# Patient Record
Sex: FEMALE | Race: OTHER | NOT HISPANIC OR LATINO | Employment: UNEMPLOYED | URBAN - METROPOLITAN AREA
[De-identification: names, ages, dates, MRNs, and addresses within clinical notes are randomized per-mention and may not be internally consistent; named-entity substitution may affect disease eponyms.]

---

## 2018-01-12 ENCOUNTER — OFFICE VISIT (OUTPATIENT)
Dept: OBSTETRICS AND GYNECOLOGY | Facility: CLINIC | Age: 33
End: 2018-01-12
Payer: MEDICAID

## 2018-01-12 VITALS
BODY MASS INDEX: 29.55 KG/M2 | WEIGHT: 183.88 LBS | SYSTOLIC BLOOD PRESSURE: 128 MMHG | HEIGHT: 66 IN | DIASTOLIC BLOOD PRESSURE: 80 MMHG

## 2018-01-12 DIAGNOSIS — R10.2 PELVIC PAIN IN FEMALE: Primary | ICD-10-CM

## 2018-01-12 DIAGNOSIS — R87.612 LGSIL ON PAP SMEAR OF CERVIX: ICD-10-CM

## 2018-01-12 DIAGNOSIS — D18.01 HEMANGIOMA OF SKIN: ICD-10-CM

## 2018-01-12 DIAGNOSIS — Z30.015 ENCOUNTER FOR INITIAL PRESCRIPTION OF VAGINAL RING HORMONAL CONTRACEPTIVE: ICD-10-CM

## 2018-01-12 DIAGNOSIS — Z87.42 HISTORY OF ENDOMETRIOSIS: ICD-10-CM

## 2018-01-12 PROCEDURE — 99203 OFFICE O/P NEW LOW 30 MIN: CPT | Mod: S$PBB,,, | Performed by: NURSE PRACTITIONER

## 2018-01-12 PROCEDURE — 99999 PR PBB SHADOW E&M-NEW PATIENT-LVL III: CPT | Mod: PBBFAC,,, | Performed by: NURSE PRACTITIONER

## 2018-01-12 PROCEDURE — 99203 OFFICE O/P NEW LOW 30 MIN: CPT | Mod: PBBFAC | Performed by: NURSE PRACTITIONER

## 2018-01-12 RX ORDER — ALBUTEROL SULFATE 90 UG/1
AEROSOL, METERED RESPIRATORY (INHALATION)
Refills: 0 | COMMUNITY
Start: 2017-11-20 | End: 2018-03-06

## 2018-01-12 RX ORDER — ETONOGESTREL AND ETHINYL ESTRADIOL VAGINAL RING .015; .12 MG/D; MG/D
1 RING VAGINAL
Qty: 3 EACH | Refills: 4 | Status: SHIPPED | OUTPATIENT
Start: 2018-01-12 | End: 2018-03-06 | Stop reason: SDUPTHER

## 2018-01-12 RX ORDER — TIZANIDINE 4 MG/1
TABLET ORAL
Refills: 0 | COMMUNITY
Start: 2017-11-20 | End: 2018-03-06

## 2018-01-12 RX ORDER — LEVOFLOXACIN 500 MG/1
TABLET, FILM COATED ORAL
Refills: 0 | COMMUNITY
Start: 2017-11-20 | End: 2018-03-06

## 2018-01-12 NOTE — PROGRESS NOTES
"HISTORY OF PRESENT ILLNESS:    Marla Rothman is a 32 y.o. female, [id number], Patient's last menstrual period was 2017 (approximate).,  presents for a routine exam and c/o endometriosis scar pain, an abnormal pap and non painful red vulvar lesions.   -Here to get established for care - second language is English - speaks Portuguese.  -Had a "botched  (in [place]) about 5 years ago, reports that her uterus was perforated", and she "had to get two tubes in her abdomen to drain out the blood".   -Since then, she developed pain in the umbilical scar, "which turned out to be endometriosis, when it was explored" (in [place]).  -Wants to get back on Nuvaring because since this last surgery she has severe abdominal and pelvic pain with her periods.   -Had a pap in December outside [facility] read as LGSIL, positive for HR HPV and was advised to have a colposcopy.   -Has seen an outside Gyn MD who told her the vulvar lesions are blood vessels from waxing, which she has stopped, but they seem to be enlarging.   -Currently on meds for bronchitis.    Past Medical History:   Diagnosis Date    Abnormal Pap smear of cervix     Endometriosis in scar 2013    umbilicus       Past Surgical History:   Procedure Laterality Date    Excision of umbilical endometriosis      Excision scar      PELVIC LAPAROSCOPY      In [place]       MEDICATIONS AND ALLERGIES:  Ventolin inhaler  Virtussin AC  Levaquin    Review of patient's allergies indicates:   Allergen Reactions    Latex, natural rubber        Family History   Problem Relation Age of Onset    Breast cancer Neg Hx     Colon cancer Neg Hx     Ovarian cancer Neg Hx        Social History     Social History    Marital status:      Spouse name: N/A    Number of children: N/A    Years of education: N/A     Occupational History    Not on file.     Social History Main Topics    Smoking status: Current Every Day Smoker    Smokeless tobacco: Never Used    " "Alcohol use Yes    Drug use: No    Sexual activity: Not Currently     Partners: Male     Other Topics Concern    Not on file     Social History Narrative    No narrative on file       OB HISTORY: Number of EAB:3    COMPREHENSIVE GYN HISTORY:  PAP History: Reports abnormal Pap: 2017: LGSIL + HR HPV. .  Infection History: Reports STDs: HPV. Denies PID.  Benign History: Denies uterine fibroids. Denies ovarian cysts. Reports endometriosis. Denies other conditions.  Cancer History: Denies cervical cancer. Denies uterine cancer or hyperplasia. Denies ovarian cancer. Denies vulvar cancer or pre-cancer. Denies vaginal cancer or pre-cancer. Denies breast cancer. Denies colon cancer.  Sexual Activity History: Denies currently being sexually active  Menstrual History: Monthly. Mod then light flow.   Dysmenorrhea History: Reports severe dysmenorrhea.   Contraception: N/A.    ROS:  GENERAL: No weight changes. No swelling. No fatigue. No fever.  CARDIOVASCULAR: No chest pain. No shortness of breath. No leg cramps.   NEUROLOGICAL: No headaches. No vision changes.  BREASTS: No pain. No lumps. No discharge.  ABDOMEN: + DYSMENORRHEA No nausea. No vomiting. No diarrhea. No constipation.  REPRODUCTIVE: No abnormal bleeding.   VULVA: No pain. + LESIONS. No itching.  VAGINA: No relaxation. No itching. No odor. No discharge. No lesions.  URINARY: No incontinence. No nocturia. No frequency. No dysuria.    /80   Ht 5' 6" (1.676 m)   Wt 83.4 kg (183 lb 13.8 oz)   LMP 12/20/2017 (Approximate)   BMI 29.68 kg/m²     PE:  APPEARANCE: Well nourished, well developed, in no acute distress.  AFFECT: WNL, alert and oriented x 3.  SKIN: No acne or hirsutism.  ABDOMEN: Soft. There is TENDERNESS AROUND THE UMBILICUS, BUT CANNOT VISUALIZE THE SCAR DUE TO THE LARGE BLACK TATTOO. No rebound.  PELVIC: External female genitalia with CHERRY ANGIOMAS BILATERAL VULVA.  Female hair distribution. Adequate perineal body, Normal urethral meatus. " "Vagina moist and well rugated without lesions or discharge.  No significant cystocele or rectocele present. Cervix pink without lesions, discharge or tenderness. Uterus is 4-6 week size, regular, mobile and nontender. Adnexa without masses or tenderness.    PROCEDURES:  UPT negative    DIAGNOSIS:  1. Pelvic pain in female    2. History of umbilical scar endometriosis     3. Encounter for initial prescription of vaginal ring hormonal contraceptive    4. LGSIL on Pap smear of cervix    5. Hemangioma of vulva skin        PLAN:    Orders Placed This Encounter    US Pelvis Comp with Transvag NON-OB (xpd    US Abdomen Complete    etonogestrel-ethinyl estradiol (NUVARING) 0.12-0.015 mg/24 hr vaginal ring       COUNSELING:  The patient was counseled today on:  -would like to get the records, but pt states they are "in [place]";  -after ultrasound results will refer her to Dr Lara in the pelvic pain clinic for further management;  -all contraceptive options and she chose Nuvaring;  -Nuvaring use and potential side effects;  -STDs and prevention;  -benign nature of vulvar angiomas.    FOLLOW-UP with me for a colposcopy and after US results, with Dr Lara.      "

## 2018-01-31 ENCOUNTER — HOSPITAL ENCOUNTER (OUTPATIENT)
Dept: RADIOLOGY | Facility: OTHER | Age: 33
Discharge: HOME | End: 2018-01-31
Attending: NURSE PRACTITIONER
Payer: MEDICAID

## 2018-01-31 DIAGNOSIS — R10.2 PELVIC PAIN IN FEMALE: ICD-10-CM

## 2018-01-31 DIAGNOSIS — Z87.42 HISTORY OF ENDOMETRIOSIS: ICD-10-CM

## 2018-01-31 PROCEDURE — 76830 TRANSVAGINAL US NON-OB: CPT | Mod: TC

## 2018-01-31 PROCEDURE — 76700 US EXAM ABDOM COMPLETE: CPT | Mod: 26,,, | Performed by: RADIOLOGY

## 2018-01-31 PROCEDURE — 76856 US EXAM PELVIC COMPLETE: CPT | Mod: 26,,, | Performed by: INTERNAL MEDICINE

## 2018-01-31 PROCEDURE — 76830 TRANSVAGINAL US NON-OB: CPT | Mod: 26,,, | Performed by: INTERNAL MEDICINE

## 2018-01-31 PROCEDURE — 76700 US EXAM ABDOM COMPLETE: CPT | Mod: TC

## 2018-02-01 ENCOUNTER — TELEPHONE (OUTPATIENT)
Dept: OBSTETRICS AND GYNECOLOGY | Facility: CLINIC | Age: 33
End: 2018-02-01

## 2018-02-01 DIAGNOSIS — N83.201 CYST OF RIGHT OVARY: Primary | ICD-10-CM

## 2018-02-01 NOTE — TELEPHONE ENCOUNTER
----- Message from Kalyan Morillo sent at 2/1/2018  2:56 PM CST -----  JOSÉ MANUEL,PLEASE CALL THIS PT AND SCHEDULE A APPT WITH DR DUNN WHEN YOU CALL SAY HE IS A ENDOMETRIOSIS SPECIALIST SHE HAS MEDICAID AND IT WILL NOT LET ME SCHEDULE [phone] THANKS

## 2018-02-01 NOTE — TELEPHONE ENCOUNTER
----- Message from Kalyan Morillo sent at 2/1/2018  2:37 PM CST -----  Call pt and schedule her colpo but pt says she will wait and speak with you before she schedule another u/s or see another Doctor for pain.    PHONE CALL: Explained that Dr Lara is the expert in endometriosis we discussed at the last visit. Pt understands Ms Biggs's call now. Ms Biggs will call her back. Sarika Stallings NP

## 2018-02-05 ENCOUNTER — TELEPHONE (OUTPATIENT)
Dept: OBSTETRICS AND GYNECOLOGY | Facility: CLINIC | Age: 33
End: 2018-02-05

## 2018-02-05 NOTE — TELEPHONE ENCOUNTER
----- Message from Kalyan Morillo sent at 2/5/2018  8:51 AM CST -----  JOSÉ MANUEL,please call pt Sarika is referring pt to [facility] for colpo and consult for endometriosos [phone]

## 2018-02-05 NOTE — TELEPHONE ENCOUNTER
Patient was scheduled for consult with Dr. Lara on 3/6 and instructed to keep colposcopy appointment with Sarika Stallings on 2/12,patient verbalized understanding.

## 2018-03-06 ENCOUNTER — OFFICE VISIT (OUTPATIENT)
Dept: OBSTETRICS AND GYNECOLOGY | Facility: CLINIC | Age: 33
End: 2018-03-06
Payer: MEDICAID

## 2018-03-06 VITALS
SYSTOLIC BLOOD PRESSURE: 120 MMHG | BODY MASS INDEX: 28.38 KG/M2 | DIASTOLIC BLOOD PRESSURE: 70 MMHG | WEIGHT: 176.56 LBS | HEIGHT: 66 IN

## 2018-03-06 DIAGNOSIS — N80.6 ENDOMETRIOSIS IN CUTANEOUS SCAR: Primary | ICD-10-CM

## 2018-03-06 DIAGNOSIS — Z30.015 ENCOUNTER FOR INITIAL PRESCRIPTION OF VAGINAL RING HORMONAL CONTRACEPTIVE: ICD-10-CM

## 2018-03-06 PROCEDURE — 99214 OFFICE O/P EST MOD 30 MIN: CPT | Mod: S$PBB,,, | Performed by: OBSTETRICS & GYNECOLOGY

## 2018-03-06 PROCEDURE — 99213 OFFICE O/P EST LOW 20 MIN: CPT | Mod: PBBFAC | Performed by: OBSTETRICS & GYNECOLOGY

## 2018-03-06 PROCEDURE — 99999 PR PBB SHADOW E&M-EST. PATIENT-LVL III: CPT | Mod: PBBFAC,,, | Performed by: OBSTETRICS & GYNECOLOGY

## 2018-03-06 RX ORDER — ETONOGESTREL AND ETHINYL ESTRADIOL VAGINAL RING .015; .12 MG/D; MG/D
1 RING VAGINAL
Qty: 3 EACH | Refills: 4 | Status: SHIPPED | OUTPATIENT
Start: 2018-03-06 | End: 2020-12-09

## 2018-03-06 NOTE — LETTER
March 6, 2018      KATHLEEN Stallings, QUINTIN  [address] [facility] [address]           [facility] [address]  [address] [facility] [address]  Phone: [phone]          Patient: Marla Rothman   MR Number: [id number]   YOB: 1985   Date of Visit: 3/6/2018       Dear KATHLEEN Stallings:    Thank you for referring Marla Rothman to me for evaluation. Attached you will find relevant portions of my assessment and plan of care.    If you have questions, please do not hesitate to call me. I look forward to following Marla Rothman along with you.    Sincerely,    Reed Lara MD    Enclosure  CC:  No Recipients    If you would like to receive this communication electronically, please contact [email or web address] or [phone] to request more information on [employer] Link access.    For providers and/or their staff who would like to refer a patient to [facility], please contact us through our one-stop-shop provider referral line, [facility], at [phone].    If you feel you have received this communication in error or would no longer like to receive these types of communications, please e-mail [email or web address]

## 2018-03-06 NOTE — PROGRESS NOTES
Subjective:       Patient ID: Marla Rothman is a 32 y.o. female.    Chief Complaint:  Pelvic Pain (Patient was referred for possible endometriosis.)      History of Present Illness  HPI  Patient is a 32-year-old with last menstrual period on February 10 it was sent to me by Sarika Stallings for further evaluation of scar endometriosis.  Obtaining the history is slightly difficult since patient has some language barrier but in summary, patient had an  at the age of 19 which was complicated by uterine perforation and a hemoperitoneum.  Patient had 2 what sounds like MIRIAM drains through her umbilicus.  Patient was admitted to the hospital for a few days.    Years later, patient started having pain monthly near the site of her old surgery.  Her surgeon in [facility] done a laparotomy and remove tissue which was confirmed to be endometriosis per the patient.  At that time, patient was counseled on using continuous birth control.  Patient started using the NuvaRing.  Otherwise, patient has no endometriosis symptoms.  Patient has no dysmenorrhea.  Patient has no pain with intercourse.    At the time of her cycle, patient has mild tenderness in the umbilicus but no significant growth.  Patient is here to discuss treatment options.    GYN & OB History  Patient's last menstrual period was 02/10/2018 (exact date).   Date of Last Pap: No result found    OB History    Para Term  AB Living   3       3 0   SAB TAB Ectopic Multiple Live Births     3            # Outcome Date GA Lbr Merrill/2nd Weight Sex Delivery Anes PTL Lv   3 TAB            2 TAB            1 TAB                   Review of Systems  Review of Systems   Constitutional: Negative for activity change, appetite change and fatigue.   HENT: Negative.  Negative for tinnitus.    Eyes: Negative.    Respiratory: Negative for cough and shortness of breath.    Cardiovascular: Negative for chest pain and palpitations.   Gastrointestinal: Negative.  Negative for  abdominal pain, blood in stool, constipation, diarrhea and nausea.   Endocrine: Negative.  Negative for hot flashes.   Genitourinary: Negative for dyspareunia, dysuria, frequency, menstrual problem, pelvic pain, vaginal discharge, dysmenorrhea, urinary incontinence and postcoital bleeding.   Musculoskeletal: Negative for back pain and joint swelling.   Skin:  Negative for rash.   Neurological: Negative.  Negative for headaches.   Hematological: Negative.  Does not bruise/bleed easily.   Psychiatric/Behavioral: The patient is not nervous/anxious.    Breast: negative.  Negative for breast mass, nipple discharge and skin changes          Objective:    Physical Exam:   Constitutional: She is oriented to person, place, and time. She appears well-developed and well-nourished. No distress.    HENT:   Head: Normocephalic and atraumatic.    Eyes: Conjunctivae and lids are normal. Pupils are equal, round, and reactive to light.    Neck: Normal range of motion and full passive range of motion without pain. Neck supple.    Cardiovascular: Normal rate and regular rhythm.  Exam reveals no edema.     Pulmonary/Chest: Effort normal and breath sounds normal. She has no wheezes.        Abdominal: Soft. Normal appearance and bowel sounds are normal. She exhibits no distension. There is no tenderness. There is no rebound and no guarding.       Large abdominal tattoo. Old infraumbilical scar is seen.  No palpable masses today.             Musculoskeletal: Normal range of motion and moves all extremeties.       Neurological: She is alert and oriented to person, place, and time. She has normal strength.    Skin: Skin is warm and dry.    Psychiatric: She has a normal mood and affect. Her speech is normal and behavior is normal. Thought content normal. Her mood appears not anxious. She does not exhibit a depressed mood. She expresses no suicidal plans and no homicidal plans.          Assessment:        1. Endometriosis in cutaneous scar     2. Encounter for initial prescription of vaginal ring hormonal contraceptive                Plan:      Marla was seen today for pelvic pain.    Diagnoses and all orders for this visit:    Endometriosis in cutaneous scar  -     etonogestrel-ethinyl estradiol (NUVARING) 0.12-0.015 mg/24 hr vaginal ring; Place 1 each vaginally every 21 days.  We had a long discussion trying to explain the etiology of endometriosis.  We also discussed the various treatment options.  Given that the patient has minimal pelvic symptoms, she should be able to manage this with oral contraceptives.   patient has not started her NuvaRing yet.  A new prescription was sent over to pharmacy.  I advised the patient did take it continuously.    He also talked about using norethindrone acetate.  And finally we briefly discussed Depo-Lupron.  Patient understands that therapy will prevent new endometriosis from coming.  It may Slowly help the old endometriosis go away.    Follow-up if pain worsens.  Encounter for initial prescription of vaginal ring hormonal contraceptive

## 2018-05-22 ENCOUNTER — HOSPITAL ENCOUNTER (EMERGENCY)
Facility: OTHER | Age: 33
Discharge: HOME | End: 2018-05-22
Attending: EMERGENCY MEDICINE
Payer: MEDICAID

## 2018-05-22 VITALS
DIASTOLIC BLOOD PRESSURE: 59 MMHG | TEMPERATURE: 99 F | HEART RATE: 87 BPM | HEIGHT: 66 IN | WEIGHT: 176.38 LBS | OXYGEN SATURATION: 99 % | RESPIRATION RATE: 16 BRPM | SYSTOLIC BLOOD PRESSURE: 125 MMHG | BODY MASS INDEX: 28.34 KG/M2

## 2018-05-22 DIAGNOSIS — R05.9 COUGH: ICD-10-CM

## 2018-05-22 DIAGNOSIS — T78.40XA ALLERGIC REACTION, INITIAL ENCOUNTER: Primary | ICD-10-CM

## 2018-05-22 LAB
ALBUMIN SERPL BCP-MCNC: 3.1 G/DL
ALP SERPL-CCNC: 85 U/L
ALT SERPL W/O P-5'-P-CCNC: 56 U/L
ANION GAP SERPL CALC-SCNC: 9 MMOL/L
AST SERPL-CCNC: 33 U/L
B-HCG UR QL: NEGATIVE
BASOPHILS # BLD AUTO: 0.01 K/UL
BASOPHILS NFR BLD: 0.1 %
BILIRUB SERPL-MCNC: 0.1 MG/DL
BUN SERPL-MCNC: 10 MG/DL
CALCIUM SERPL-MCNC: 9.4 MG/DL
CHLORIDE SERPL-SCNC: 107 MMOL/L
CO2 SERPL-SCNC: 24 MMOL/L
CREAT SERPL-MCNC: 0.7 MG/DL
CTP QC/QA: YES
DIFFERENTIAL METHOD: NORMAL
EOSINOPHIL # BLD AUTO: 0.5 K/UL
EOSINOPHIL NFR BLD: 6.2 %
ERYTHROCYTE [DISTWIDTH] IN BLOOD BY AUTOMATED COUNT: 13.4 %
EST. GFR  (AFRICAN AMERICAN): >60 ML/MIN/1.73 M^2
EST. GFR  (NON AFRICAN AMERICAN): >60 ML/MIN/1.73 M^2
GLUCOSE SERPL-MCNC: 122 MG/DL
HCT VFR BLD AUTO: 39.4 %
HGB BLD-MCNC: 13 G/DL
LYMPHOCYTES # BLD AUTO: 1.8 K/UL
LYMPHOCYTES NFR BLD: 22.9 %
MCH RBC QN AUTO: 29.1 PG
MCHC RBC AUTO-ENTMCNC: 33 G/DL
MCV RBC AUTO: 88 FL
MONOCYTES # BLD AUTO: 0.4 K/UL
MONOCYTES NFR BLD: 5.5 %
NEUTROPHILS # BLD AUTO: 5.2 K/UL
NEUTROPHILS NFR BLD: 65.2 %
PLATELET # BLD AUTO: 310 K/UL
PMV BLD AUTO: 9.7 FL
POTASSIUM SERPL-SCNC: 4.4 MMOL/L
PROT SERPL-MCNC: 7.2 G/DL
RBC # BLD AUTO: 4.47 M/UL
SODIUM SERPL-SCNC: 140 MMOL/L
WBC # BLD AUTO: 8.02 K/UL

## 2018-05-22 PROCEDURE — 81025 URINE PREGNANCY TEST: CPT | Performed by: EMERGENCY MEDICINE

## 2018-05-22 PROCEDURE — 63600175 PHARM REV CODE 636 W HCPCS: Performed by: PHYSICIAN ASSISTANT

## 2018-05-22 PROCEDURE — 85025 COMPLETE CBC W/AUTO DIFF WBC: CPT

## 2018-05-22 PROCEDURE — 99284 EMERGENCY DEPT VISIT MOD MDM: CPT | Mod: 25

## 2018-05-22 PROCEDURE — 25000003 PHARM REV CODE 250: Performed by: PHYSICIAN ASSISTANT

## 2018-05-22 PROCEDURE — 80053 COMPREHEN METABOLIC PANEL: CPT

## 2018-05-22 PROCEDURE — 96372 THER/PROPH/DIAG INJ SC/IM: CPT

## 2018-05-22 RX ORDER — DIPHENHYDRAMINE HCL 25 MG
25 CAPSULE ORAL
Status: COMPLETED | OUTPATIENT
Start: 2018-05-22 | End: 2018-05-22

## 2018-05-22 RX ORDER — FAMOTIDINE 20 MG/1
20 TABLET, FILM COATED ORAL
Status: COMPLETED | OUTPATIENT
Start: 2018-05-22 | End: 2018-05-22

## 2018-05-22 RX ORDER — DEXAMETHASONE SODIUM PHOSPHATE 4 MG/ML
8 INJECTION, SOLUTION INTRA-ARTICULAR; INTRALESIONAL; INTRAMUSCULAR; INTRAVENOUS; SOFT TISSUE
Status: COMPLETED | OUTPATIENT
Start: 2018-05-22 | End: 2018-05-22

## 2018-05-22 RX ADMIN — DEXAMETHASONE SODIUM PHOSPHATE 8 MG: 4 INJECTION, SOLUTION INTRAMUSCULAR; INTRAVENOUS at 07:05

## 2018-05-22 RX ADMIN — DIPHENHYDRAMINE HYDROCHLORIDE 25 MG: 25 CAPSULE ORAL at 07:05

## 2018-05-22 RX ADMIN — FAMOTIDINE 20 MG: 20 TABLET, FILM COATED ORAL at 07:05

## 2018-05-23 NOTE — ED PROVIDER NOTES
"Encounter Date: 5/22/2018       History     Chief Complaint   Patient presents with    Rash     generalized rash x4 days. N/V, indigestion, and swelling to eyes.     Patient is a 32-year-old female to the ED with a rash. Patient states 2 days ago she had epigastric discomfort.  She states that she took [place] for this.  She states shortly after she had 1 episode of emesis.  Patient states when she woke up the next day she noticed swelling to her face.  Patient states the rash began to develop that holding of her trunk and upper extremities. She states the rash is intermittently pruritic.  She denies any known environmental exposures.  She denies fever or chills. Patient does state she had not "been feeling 100% "for a couple of months.  She states she has had a cough for the past 8 months that has improved over the past 2 weeks.  She reports taking Benadryl for symptomatic with minimal relief.  She also reports feeling mildly short of breath today.  She currently denies abdominal pain, nausea, or emesis.       The history is provided by the patient.     Review of patient's allergies indicates:   Allergen Reactions    Latex, natural rubber      Past Medical History:   Diagnosis Date    Abnormal Pap smear of cervix 2017    Endometriosis in scar 2013    umbilicus     Past Surgical History:   Procedure Laterality Date    Excision of umbilical endometriosis  2014    Excision scar      [facility]ELVIC [facility]COPY  2013    [facility]     Family History   Problem Relation Age of Onset    Breast cancer Neg Hx     Colon cancer Neg Hx     Ovarian cancer Neg Hx      Social History   Substance Use Topics    Smoking status: Current Every Day Smoker     Types: Cigarettes    Smokeless tobacco: Never Used    Alcohol use Yes      Comment: 1 time a week      Review of Systems   Constitutional: Negative for chills and fever.        General malaise   HENT: Negative for congestion and sore throat.    Eyes: Negative for pain. "   Respiratory: Positive for cough and shortness of breath.    Cardiovascular: Negative for chest pain.   Gastrointestinal: Negative for diarrhea.        Abdominal discomfort and emesis (resolved)   Genitourinary: Negative for dysuria.   Musculoskeletal: Negative for arthralgias.   Skin: Positive for rash.   Neurological: Negative for headaches.       Physical Exam     Initial Vitals [05/22/18 1814]   BP Pulse Resp Temp SpO2   (!) 140/80 103 16 99 °F (37.2 °C) 100 %      MAP       100         Physical Exam    Constitutional: Vital signs are normal. She is cooperative.   Patient in no acute distress.  She speaks in clear and full sentences.   HENT:   Head: Normocephalic and atraumatic.   Mouth/Throat: Oropharynx is clear and moist.   No oral lesions. No angioedema   Eyes: EOM are normal. Pupils are equal, round, and reactive to light.   Periorbital swelling (L >R). No conjunctival injection.    Neck: Normal range of motion. Neck supple.   Cardiovascular: Normal rate, regular rhythm and intact distal pulses.   Pulmonary/Chest: Breath sounds normal. She has no wheezes. She has no rhonchi. She has no rales.   Abdominal: Soft. Bowel sounds are normal. She exhibits no mass. There is no tenderness.   Musculoskeletal: Normal range of motion. She exhibits no edema.   Neurological: She is alert and oriented to person, place, and time. GCS eye subscore is 4. GCS verbal subscore is 5. GCS motor subscore is 6.   Skin: Skin is warm and dry. Capillary refill takes less than 2 seconds.   Macular papular rash to trunk and upper and lower extremities. No vesicles or pustules.  Rash spares the face.   Psychiatric: She has a normal mood and affect. Her behavior is normal.         ED Course   Procedures  Labs Reviewed   POCT URINE PREGNANCY         Imaging Results          X-Ray Chest PA And Lateral (Final result)  Result time 05/22/18 19:45:45    Final result by Saad Feliciano MD (05/22/18 19:45:45)                 Impression:       No acute cardiopulmonary process.      Electronically signed by: Saad Feliciano MD  Date:    05/22/2018  Time:    19:45             Narrative:    EXAMINATION:  XR CHEST PA AND LATERAL    CLINICAL HISTORY:  Cough    TECHNIQUE:  PA and lateral views of the chest were performed.    COMPARISON:  None.    FINDINGS:  There is no consolidation, effusion, or pneumothorax.    Cardiomediastinal silhouette is unremarkable.    Regional osseous structures are unremarkable.                                   Medical Decision Making:   Initial Assessment:   Urgent evaluation of a 32 y.o. female presenting to the emergency department complaining of rash. Patient is afebrile, nontoxic appearing and hemodynamically stable.  Differential includes allergic reaction, viral exanthem, or viral URI.  We will obtain basic labs and chest x-ray.  Patient will be given Decadron injection, Pepcid, and Benadryl and will be reassessed.  ED Management:  No sign of anaphylaxis.  Lab work reveals unremarkable CBC.  Renal function.  Mild increase in ALT, 56.  CXR reveals no acute cardiopulmonary process. Patient's symptoms improved with interventions given here in the ED.  She was advised to continue taking Benadryl and Pepcid for her symptoms.  She is advised to follow up with her primary care provider or return here for new or worsening symptoms.  She has no other complaints and is stable for discharge.  I have discussed the patient with the attending thoroughly and he/she agrees to the treatment and plan. This note was created using Dragon Medical Dictation. There may be typographical errors secondary to dictation.                       Clinical Impression:     1. Allergic reaction, initial encounter    2. Cough                             Harshad Pulido PA-C  05/22/18 2149

## 2018-05-23 NOTE — ED NOTES
"Pt presents to ED by self with complaints of generalized rash x 4 days. Rash is throughout entire body, no drainage noted. Some swelling noted around the eyes. Pt reports an episode of nausea/vomiting 2 day ago, no nausea/vomiting at this encounter. Pt states "I took out my IUD yesterday because I thought that might be causing the rash". Pt reports starting her menstrual cycle today. Pt denies any CP, SOB, numbness, tingling, generalized weakness. AAOx4, RR even and unlabored. Will continue to monitor.   "

## 2018-06-06 ENCOUNTER — TELEPHONE (OUTPATIENT)
Dept: OBSTETRICS AND GYNECOLOGY | Facility: CLINIC | Age: 33
End: 2018-06-06

## 2018-06-06 NOTE — TELEPHONE ENCOUNTER
----- Message from Paula Schneider sent at 6/6/2018 12:21 PM CDT -----  Contact: pt            Name of Who is Calling: pt      What is the request in detail: pt needs to schedule a biopsy. Please call pt      Can the clinic reply by [facility]: no      What Number to Call Back if not in [facility]: [phone]

## 2018-06-18 ENCOUNTER — TELEPHONE (OUTPATIENT)
Dept: OBSTETRICS AND GYNECOLOGY | Facility: CLINIC | Age: 33
End: 2018-06-18

## 2018-06-18 NOTE — TELEPHONE ENCOUNTER
LVM:  Good Morning Ms Rothman, this is Elzbitea from Dr Lara's office returning your call. Please call our office back at [phone].    Thank You

## 2018-06-18 NOTE — TELEPHONE ENCOUNTER
----- Message from Eleanor Boyer sent at 6/18/2018  9:45 AM CDT -----  Contact: Judith  Name of Who is Calling: Judith      What is the request in detail: Patient states she needs to reschedule her procedure on 06/19/2018 because she has started her menstrual cycle      Can the clinic reply by [facility]: No       What Number to Call Back if not in [facility]: [phone]

## 2018-06-28 ENCOUNTER — TELEPHONE (OUTPATIENT)
Dept: OBSTETRICS AND GYNECOLOGY | Facility: CLINIC | Age: 33
End: 2018-06-28

## 2018-06-28 NOTE — TELEPHONE ENCOUNTER
Spoke with pt:  Pt stated she could not make her appt today because her car broke down.    Pt rescheduled appt for 7/12/18    Pt verbalized understanding of time, date, and location of appt.

## 2018-06-28 NOTE — TELEPHONE ENCOUNTER
----- Message from Merline Buchanan sent at 6/28/2018 10:08 AM CDT -----  Contact: Self  Please reschedule her procedure. She had car issues today

## 2019-03-26 ENCOUNTER — HOSPITAL ENCOUNTER (EMERGENCY)
Facility: OTHER | Age: 34
Discharge: HOME | End: 2019-03-26
Attending: EMERGENCY MEDICINE
Payer: MEDICAID

## 2019-03-26 VITALS
DIASTOLIC BLOOD PRESSURE: 75 MMHG | WEIGHT: 200 LBS | TEMPERATURE: 99 F | OXYGEN SATURATION: 100 % | BODY MASS INDEX: 32.28 KG/M2 | HEART RATE: 84 BPM | SYSTOLIC BLOOD PRESSURE: 118 MMHG | RESPIRATION RATE: 16 BRPM

## 2019-03-26 DIAGNOSIS — T78.40XA ALLERGIC REACTION, INITIAL ENCOUNTER: Primary | ICD-10-CM

## 2019-03-26 LAB
B-HCG UR QL: NEGATIVE
CTP QC/QA: YES

## 2019-03-26 PROCEDURE — 63600175 PHARM REV CODE 636 W HCPCS: Performed by: PHYSICIAN ASSISTANT

## 2019-03-26 PROCEDURE — 99283 EMERGENCY DEPT VISIT LOW MDM: CPT | Mod: 25

## 2019-03-26 PROCEDURE — 25000003 PHARM REV CODE 250: Performed by: PHYSICIAN ASSISTANT

## 2019-03-26 PROCEDURE — 81025 URINE PREGNANCY TEST: CPT | Performed by: EMERGENCY MEDICINE

## 2019-03-26 RX ORDER — PREDNISONE 20 MG/1
60 TABLET ORAL
Status: COMPLETED | OUTPATIENT
Start: 2019-03-26 | End: 2019-03-26

## 2019-03-26 RX ORDER — FAMOTIDINE 20 MG/1
20 TABLET, FILM COATED ORAL
Status: COMPLETED | OUTPATIENT
Start: 2019-03-26 | End: 2019-03-26

## 2019-03-26 RX ORDER — DIPHENHYDRAMINE HCL 25 MG
25 CAPSULE ORAL
Status: COMPLETED | OUTPATIENT
Start: 2019-03-26 | End: 2019-03-26

## 2019-03-26 RX ADMIN — PREDNISONE 60 MG: 20 TABLET ORAL at 04:03

## 2019-03-26 RX ADMIN — DIPHENHYDRAMINE HYDROCHLORIDE 25 MG: 25 CAPSULE ORAL at 04:03

## 2019-03-26 RX ADMIN — FAMOTIDINE 20 MG: 20 TABLET, FILM COATED ORAL at 04:03

## 2019-03-26 NOTE — ED PROVIDER NOTES
Encounter Date: 3/26/2019    SCRIBE #1 NOTE: I, Katiana Mosley, am scribing for, and in the presence of, Dr. Olea.       History     Chief Complaint   Patient presents with    Facial Swelling     took pepto bismol yesterday evening and has been having SOB, facial swelling since last night. Took benadryl last night at 2400. Denies sore throat, oral swelling, or difficulty swallowing. Admits similar symptoms from pepto bismol in past.      Time seen by provider: 5:04 PM    This is a 33 y.o. female who presents with complaint of allergic reaction to Pepto bismol. She reports facial swelling and generalized rash that began over her abdomen. She had some shortness of breath, though this has resolved. She denies throat tightness or difficulty swallowing. She experienced similar reaction last month. She reports known allergy to latex. She has not been seen by an allergist since she was a child. She notes taking the Pepto bismol for diarrhea.    The history is provided by the patient.     Review of patient's allergies indicates:   Allergen Reactions    Latex, natural rubber     Pepto-bismol [bismuth subsalicylate]      Facial swelling       Past Medical History:   Diagnosis Date    Abnormal Pap smear of cervix 2017    Endometriosis in scar 2013    umbilicus     Past Surgical History:   Procedure Laterality Date    Excision of umbilical endometriosis  2014    Excision scar      PELVIC LAPAROSCOPY  2013    In [facility]     Family History   Problem Relation Age of Onset    Breast cancer Neg Hx     Colon cancer Neg Hx     Ovarian cancer Neg Hx      Social History     Tobacco Use    Smoking status: Current Every Day Smoker     Types: Cigarettes    Smokeless tobacco: Never Used   Substance Use Topics    Alcohol use: Yes     Comment: 1 time a week     Drug use: No     Review of Systems   Constitutional: Negative for chills and fever.   HENT: Positive for facial swelling. Negative for congestion, sore throat and  trouble swallowing.         Negative for throat tightness.   Eyes: Negative for photophobia and redness.   Respiratory: Positive for shortness of breath (resolved). Negative for cough.    Cardiovascular: Negative for chest pain.   Gastrointestinal: Positive for diarrhea. Negative for abdominal pain, nausea and vomiting.   Genitourinary: Negative for dysuria.   Musculoskeletal: Negative for back pain.   Skin: Positive for rash.   Allergic/Immunologic:        Positive for allergy to medicine.   Neurological: Negative for weakness, light-headedness and headaches.   Psychiatric/Behavioral: Negative for confusion.       Physical Exam     Initial Vitals [03/26/19 1603]   BP Pulse Resp Temp SpO2   134/64 96 16 98.9 °F (37.2 °C) 99 %      MAP       --         Physical Exam    Nursing note and vitals reviewed.  Constitutional: She appears well-developed and well-nourished. She is not diaphoretic. No distress.   HENT:   Head: Normocephalic and atraumatic.   Mouth/Throat: Oropharynx is clear and moist. No trismus in the jaw.   No drooling. No facial swelling.   Eyes: Conjunctivae and EOM are normal. Pupils are equal, round, and reactive to light. No scleral icterus.   Neck: Normal range of motion. Neck supple.   Cardiovascular: Normal rate, regular rhythm, S1 normal, S2 normal and normal heart sounds. Exam reveals no gallop and no friction rub.    No murmur heard.  Pulmonary/Chest: Breath sounds normal. No stridor. No respiratory distress. She has no wheezes. She has no rhonchi. She has no rales.   Musculoskeletal: Normal range of motion. She exhibits no edema or tenderness.   Neurological: She is alert and oriented to person, place, and time.   Skin: Skin is warm and dry.   Maculopapular rash over entire body.   Psychiatric: She has a normal mood and affect. Her behavior is normal. Judgment and thought content normal.         ED Course   Procedures  Labs Reviewed   POCT URINE PREGNANCY          Imaging Results    None           Medical Decision Making:   Clinical Tests:   Lab Tests: Reviewed            Scribe Attestation:   Scribe #1: I performed the above scribed service and the documentation accurately describes the services I performed. I attest to the accuracy of the note.    Attending Attestation:           Physician Attestation for Scribe:  Physician Attestation Statement for Scribe #1: I, Dr. Virgen, reviewed documentation, as scribed by Katiana Mosley in my presence, and it is both accurate and complete.         Attending ED Notes:   Emergent evaluation a 33-year-old female with complaint of facial swelling, rash and a transient episode of shortness of breath after taking Pepto-Bismol.  Patient is afebrile, nontoxic appearing with stable vital signs. No trismus, stridor or drooling.  No appreciable facial edema or swelling. Lungs are clear to auscultation bilaterally.  Oxygen saturation is 100%.  Patient is treated with p.o. medications.  The patient is extensively counseled on her diagnosis and treatment, discharged good condition and directed to follow up with her PCP in the next 24-48 hours.             Clinical Impression:     1. Allergic reaction, initial encounter                                 Jeffrey Virgen MD  03/26/19 6936

## 2019-03-26 NOTE — ED NOTES
Urine sent to lab with hold sticker. DEANNA Bone notified of patient status. Pt also reports generalized rash. Pt in direct view of triage nurse.

## 2019-03-26 NOTE — ED TRIAGE NOTES
"Pt reports taking pepto bismol yesterday and having an allergic reaction to it. Reports taking benadryl last night. Reports vic on abdomen, stomach, legs. Reports swelling to face, eyes. Denies difficulty swallowing. "But it feels like it is getting harder to breathe. Pt speaking in full sentences in no acute distress. AAO X 3, answers questions appropriately. Placed on continuous pulse ox BP cycling q 30.   "

## 2019-04-24 ENCOUNTER — LAB VISIT (OUTPATIENT)
Dept: LAB | Facility: HOSPITAL | Age: 34
End: 2019-04-24
Attending: OBSTETRICS & GYNECOLOGY
Payer: MEDICAID

## 2019-04-24 ENCOUNTER — OFFICE VISIT (OUTPATIENT)
Dept: OBSTETRICS AND GYNECOLOGY | Facility: CLINIC | Age: 34
End: 2019-04-24
Payer: MEDICAID

## 2019-04-24 VITALS
HEIGHT: 66 IN | DIASTOLIC BLOOD PRESSURE: 76 MMHG | SYSTOLIC BLOOD PRESSURE: 112 MMHG | BODY MASS INDEX: 35.12 KG/M2 | WEIGHT: 218.5 LBS

## 2019-04-24 DIAGNOSIS — Z11.3 SCREEN FOR STD (SEXUALLY TRANSMITTED DISEASE): ICD-10-CM

## 2019-04-24 DIAGNOSIS — Z12.4 ENCOUNTER FOR PAPANICOLAOU SMEAR FOR CERVICAL CANCER SCREENING: ICD-10-CM

## 2019-04-24 DIAGNOSIS — Z87.42 HISTORY OF ABNORMAL CERVICAL PAP SMEAR: ICD-10-CM

## 2019-04-24 DIAGNOSIS — N80.6 ENDOMETRIOSIS IN CUTANEOUS SCAR: ICD-10-CM

## 2019-04-24 DIAGNOSIS — Z01.419 WELL WOMAN EXAM WITH ROUTINE GYNECOLOGICAL EXAM: Primary | ICD-10-CM

## 2019-04-24 PROCEDURE — 87480 CANDIDA DNA DIR PROBE: CPT

## 2019-04-24 PROCEDURE — 99395 PR PREVENTIVE VISIT,EST,18-39: ICD-10-PCS | Mod: S$PBB,,, | Performed by: OBSTETRICS & GYNECOLOGY

## 2019-04-24 PROCEDURE — 87624 HPV HI-RISK TYP POOLED RSLT: CPT

## 2019-04-24 PROCEDURE — 88175 CYTOPATH C/V AUTO FLUID REDO: CPT

## 2019-04-24 PROCEDURE — 86703 HIV-1/HIV-2 1 RESULT ANTBDY: CPT

## 2019-04-24 PROCEDURE — 87510 GARDNER VAG DNA DIR PROBE: CPT

## 2019-04-24 PROCEDURE — 99213 OFFICE O/P EST LOW 20 MIN: CPT | Mod: PBBFAC | Performed by: OBSTETRICS & GYNECOLOGY

## 2019-04-24 PROCEDURE — 80074 ACUTE HEPATITIS PANEL: CPT

## 2019-04-24 PROCEDURE — 86592 SYPHILIS TEST NON-TREP QUAL: CPT

## 2019-04-24 PROCEDURE — 99999 PR PBB SHADOW E&M-EST. PATIENT-LVL III: CPT | Mod: PBBFAC,,, | Performed by: OBSTETRICS & GYNECOLOGY

## 2019-04-24 PROCEDURE — 87491 CHLMYD TRACH DNA AMP PROBE: CPT

## 2019-04-24 PROCEDURE — 99999 PR PBB SHADOW E&M-EST. PATIENT-LVL III: ICD-10-PCS | Mod: PBBFAC,,, | Performed by: OBSTETRICS & GYNECOLOGY

## 2019-04-24 PROCEDURE — 36415 COLL VENOUS BLD VENIPUNCTURE: CPT

## 2019-04-24 PROCEDURE — 99395 PREV VISIT EST AGE 18-39: CPT | Mod: S$PBB,,, | Performed by: OBSTETRICS & GYNECOLOGY

## 2019-04-24 NOTE — PROGRESS NOTES
SUBJECTIVE:   Marla Rothman is a 33 y.o. female   for annual well woman exam. Patient's last menstrual period was 04/15/2019 (exact date)..  She has a few concerns. See note below.      Contraception: none, currently not sexually active      Past Medical History:   Diagnosis Date    Abnormal Pap smear of cervix 2017    Endometriosis in scar 2013    umbilicus     Past Surgical History:   Procedure Laterality Date    Excision of umbilical endometriosis      Excision scar      [facility]COPY  2013    In [facility]     Social History     Socioeconomic History    Marital status:      Spouse name: Not on file    Number of children: Not on file    Years of education: Not on file    Highest education level: Not on file   Occupational History    Not on file   Social Needs    Financial resource strain: Not on file    Food insecurity:     Worry: Not on file     Inability: Not on file    Transportation needs:     Medical: Not on file     Non-medical: Not on file   Tobacco Use    Smoking status: Current Every Day Smoker     Types: Cigarettes    Smokeless tobacco: Never Used   Substance and Sexual Activity    Alcohol use: Yes     Comment: 1 time a week     Drug use: No    Sexual activity: Not Currently     Partners: Male     Birth control/protection: None   Lifestyle    Physical activity:     Days per week: Not on file     Minutes per session: Not on file    Stress: Not on file   Relationships    Social connections:     Talks on phone: Not on file     Gets together: Not on file     Attends Sabianist service: Not on file     Active member of club or organization: Not on file     Attends meetings of clubs or organizations: Not on file     Relationship status: Not on file   Other Topics Concern    Not on file   Social History Narrative    Not on file     Family History   Problem Relation Age of Onset    Breast cancer Neg Hx     Colon cancer Neg Hx     Ovarian cancer Neg Hx      OB  "History    Para Term  AB Living   3       3 0   SAB TAB Ectopic Multiple Live Births     3            # Outcome Date GA Lbr [place]ill/2nd Weight Sex Delivery Anes PTL Lv   3 TAB            2 TAB            1 TAB               Obstetric Comments   TAB x 3, D&C with uterine perforation and hemoperitoneum requiring J tube, in [facility]   Endometriosis found by removal of umbilical scar   H/o LSIL pap 2017, HPV positive, was told to have colpo missed appt         Current Outpatient Medications   Medication Sig Dispense Refill    etonogestrel-ethinyl estradiol (NUVARING) 0.12-0.015 mg/24 hr vaginal ring Place 1 each vaginally every 21 days. 3 each 4     No current facility-administered medications for this visit.      Allergies: Latex, natural rubber and Pepto-bismol [bismuth subsalicylate]       ROS:  GENERAL: Denies weight gain or weight loss. Feeling well overall.   SKIN: Denies rash or lesions.   HEAD: Denies head injury or headache.   NODES: Denies enlarged lymph nodes.   CHEST: Denies chest pain or shortness of breath.   CARDIOVASCULAR: Denies palpitations or left sided chest pain.   ABDOMEN: No abdominal pain, constipation, diarrhea, nausea, vomiting or rectal bleeding.   URINARY: No frequency, dysuria, hematuria, or burning on urination.  REPRODUCTIVE: Denies vaginal discharge, abnormal vaginal bleeding, lesions, pelvic pain  BREASTS: The patient performs breast self-examination and denies pain, lumps, or nipple discharge.   HEMATOLOGIC: No easy bruisability or excessive bleeding.  MUSCULOSKELETAL: Denies joint pain or swelling.   NEUROLOGIC: Denies syncope or weakness.   PSYCHIATRIC: Denies depression, anxiety or mood swings.      OBJECTIVE:   /76 (BP Location: Left arm, Patient Position: Sitting, BP Method: Medium (Manual))   Ht 5' 6" (1.676 m)   Wt 99.1 kg (218 lb 7.6 oz)   LMP 04/15/2019 (Exact Date)   BMI 35.26 kg/m²   The patient appears well, alert, oriented x 3, in no distress.  PSYCH:  " "Normal, full range of affect  NECK: negative, no thyromegaly, trachea midline  SKIN: normal, good color, good turgor and no acne, striae, hirsutism  BREAST EXAM: breasts appear normal, no suspicious masses, no skin or nipple changes or axillary nodes  ABDOMEN: large abdominal tatoo noted, covering the umbilicus. non tender on umbilicus and no hernias, masses, or hepatosplenomegaly  GENITALIA: external genitalia with no erythema, no discharge. 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   BLADDER: soft  URETHRA: normal appearing urethra with no masses, tenderness or lesions and normal urethra, normal urethral meatus  VAGINA: Normal  CERVIX: no lesions or cervical motion tenderness  UTERUS: normal size, contour, position, consistency, mobility, non-tender  ADNEXA: no mass, fullness, tenderness    ASSESSMENT:   1. Health maintenance  -h/o abnormal pap: repeat today  -std screening, advised condoms for std prevention  -counseled on exercise and healthy diet, weight loss  -bone health:  Discussed Vitamin D and Calcium supplementation, weight bearing exercises  2.  Discussed safety at home/school/work, healthy and balanced diet, sleep hygiene, as well as violence/weapons exposure.         CC:  Vulvar lesion, abnormal pap    HPI:  Pt with a few concerns  1.  Patient was seen by [facility] last year for evaluation of abnormal pap, which was done by an  Outside clinic - health center.  LSIL, pt was advised to have f/u colposcopy but had cancelled her appt.  She is here today want "to treat the abnormal pap".  She has not been sexually active with her boy friend due to this history, they have been together for 1 year  2.  Also has concerns regarding vulvar lesions - which she thinks it is genital warts and would like a cream to take care of it.  Said it is embarrassing.  She was evaluated before by a few providers and was told it is blood vessels, which she does not think so.  Reported she was waxing " "before and noticed this lesion - no longer waxing but the lesions seem to be enlarging.   3.  Pt had a known diagnosis of endometriosis - diagnosed after an abdominal scar was removed.  Pt reported sometimes she had umbilical pain during menstrual cycle, not sure if it is endometriosis or " from my head", not bothersome - other than this she is not concerned.  Pt  Denies dyspareunia, dyschezia. Or dysmenorrhea.  She was prescribed nuvaring before but never started on this.    PE:  ABDOMEN: large abdominal tatoo noted, covering the umbilicus. non tender on umbilicus and no hernias, masses, or hepatosplenomegaly  GENITALIA: external genitalia with no erythema, no discharge. 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   BLADDER: soft  URETHRA: normal appearing urethra with no masses, tenderness or lesions and normal urethra, normal urethral meatus  VAGINA: Normal  CERVIX: no lesions or cervical motion tenderness  UTERUS: normal size, contour, position, consistency, mobility, non-tender  ADNEXA: no mass, fullness, tenderness    A/P  1.  Discussed likely hemangioma:  Pt not convinced and still asking for "a cream for this. " plan to biopsy this area.  2.  Abnormal pap: given last pap >1.5 years ago, will  Repeat today.  Start pt on gardasil series  3.  Endometriosis:  Discussed continuing of hormonal therapy for suppression and prevention of new lesions.  Pt still does not want to restart on nuvaring since she is not sexually active.     "

## 2019-04-25 PROBLEM — N80.6 ENDOMETRIOSIS IN CUTANEOUS SCAR: Status: ACTIVE | Noted: 2019-04-25

## 2019-04-25 PROBLEM — Z87.42 HISTORY OF ABNORMAL CERVICAL PAP SMEAR: Status: ACTIVE | Noted: 2019-04-25

## 2019-04-25 LAB
HAV IGM SERPL QL IA: NEGATIVE
HBV CORE IGM SERPL QL IA: NEGATIVE
HBV SURFACE AG SERPL QL IA: NEGATIVE
HCV AB SERPL QL IA: NEGATIVE
HIV 1+2 AB+HIV1 P24 AG SERPL QL IA: NEGATIVE
RPR SER QL: NORMAL

## 2019-04-27 LAB
BACTERIAL VAGINOSIS DNA: NEGATIVE
C TRACH DNA SPEC QL NAA+PROBE: NOT DETECTED
CANDIDA GLABRATA DNA: NEGATIVE
CANDIDA KRUSEI DNA: NEGATIVE
CANDIDA RRNA VAG QL PROBE: NEGATIVE
N GONORRHOEA DNA SPEC QL NAA+PROBE: NOT DETECTED
T VAGINALIS RRNA GENITAL QL PROBE: NEGATIVE

## 2019-04-30 LAB
HPV HR 12 DNA CVX QL NAA+PROBE: NEGATIVE
HPV16 AG SPEC QL: NEGATIVE
HPV18 DNA SPEC QL NAA+PROBE: NEGATIVE

## 2019-05-01 NOTE — PROGRESS NOTES
Please inform pt pap smear is normal, hpv is normal  Her prior abnormal pap has cleared up  I would repeat pap smear in one year for closer surveillance.  If pt still desires biopsy of the vulvar lesion, please schedule an appointment

## 2019-05-15 ENCOUNTER — HOSPITAL ENCOUNTER (INPATIENT)
Facility: OTHER | Age: 34
LOS: 2 days | Discharge: HOME | DRG: 872 | End: 2019-05-17
Attending: EMERGENCY MEDICINE | Admitting: INTERNAL MEDICINE
Payer: MEDICAID

## 2019-05-15 DIAGNOSIS — R50.9 FEVER, UNSPECIFIED FEVER CAUSE: ICD-10-CM

## 2019-05-15 DIAGNOSIS — A41.9 SEPSIS: Primary | ICD-10-CM

## 2019-05-15 DIAGNOSIS — D72.829 LEUKOCYTOSIS, UNSPECIFIED TYPE: ICD-10-CM

## 2019-05-15 DIAGNOSIS — I95.9 HYPOTENSION, UNSPECIFIED HYPOTENSION TYPE: ICD-10-CM

## 2019-05-15 LAB
ALBUMIN SERPL BCP-MCNC: 3.7 G/DL (ref 3.5–5.2)
ALP SERPL-CCNC: 68 U/L (ref 55–135)
ALT SERPL W/O P-5'-P-CCNC: 17 U/L (ref 10–44)
ANION GAP SERPL CALC-SCNC: 9 MMOL/L (ref 8–16)
AST SERPL-CCNC: 13 U/L (ref 10–40)
B-HCG UR QL: NEGATIVE
BACTERIA #/AREA URNS HPF: NORMAL /HPF
BASOPHILS # BLD AUTO: 0.01 K/UL (ref 0–0.2)
BASOPHILS NFR BLD: 0.1 % (ref 0–1.9)
BILIRUB SERPL-MCNC: 0.4 MG/DL (ref 0.1–1)
BILIRUB UR QL STRIP: NEGATIVE
BUN SERPL-MCNC: 9 MG/DL (ref 6–20)
CALCIUM SERPL-MCNC: 9.4 MG/DL (ref 8.7–10.5)
CHLORIDE SERPL-SCNC: 102 MMOL/L (ref 95–110)
CLARITY UR: CLEAR
CO2 SERPL-SCNC: 24 MMOL/L (ref 23–29)
COLOR UR: YELLOW
CREAT SERPL-MCNC: 0.8 MG/DL (ref 0.5–1.4)
CTP QC/QA: YES
DIFFERENTIAL METHOD: ABNORMAL
EOSINOPHIL # BLD AUTO: 0 K/UL (ref 0–0.5)
EOSINOPHIL NFR BLD: 0.1 % (ref 0–8)
ERYTHROCYTE [DISTWIDTH] IN BLOOD BY AUTOMATED COUNT: 13.3 % (ref 11.5–14.5)
EST. GFR  (AFRICAN AMERICAN): >60 ML/MIN/1.73 M^2
EST. GFR  (NON AFRICAN AMERICAN): >60 ML/MIN/1.73 M^2
GLUCOSE SERPL-MCNC: 116 MG/DL (ref 70–110)
GLUCOSE UR QL STRIP: NEGATIVE
HCG INTACT+B SERPL-ACNC: <1.2 MIU/ML
HCT VFR BLD AUTO: 36.7 % (ref 37–48.5)
HGB BLD-MCNC: 12.6 G/DL (ref 12–16)
HGB UR QL STRIP: ABNORMAL
INFLUENZA A, MOLECULAR: NEGATIVE
INFLUENZA B, MOLECULAR: NEGATIVE
INR PPP: 0.9 (ref 0.8–1.2)
KETONES UR QL STRIP: NEGATIVE
LACTATE SERPL-SCNC: 0.9 MMOL/L (ref 0.5–2.2)
LACTATE SERPL-SCNC: 1.7 MMOL/L (ref 0.5–2.2)
LEUKOCYTE ESTERASE UR QL STRIP: NEGATIVE
LYMPHOCYTES # BLD AUTO: 1.3 K/UL (ref 1–4.8)
LYMPHOCYTES NFR BLD: 7.6 % (ref 18–48)
MCH RBC QN AUTO: 28.5 PG (ref 27–31)
MCHC RBC AUTO-ENTMCNC: 34.3 G/DL (ref 32–36)
MCV RBC AUTO: 83 FL (ref 82–98)
MICROSCOPIC COMMENT: NORMAL
MONOCYTES # BLD AUTO: 0.9 K/UL (ref 0.3–1)
MONOCYTES NFR BLD: 5 % (ref 4–15)
NEUTROPHILS # BLD AUTO: 15 K/UL (ref 1.8–7.7)
NEUTROPHILS NFR BLD: 86.9 % (ref 38–73)
NITRITE UR QL STRIP: NEGATIVE
PH UR STRIP: 7 [PH] (ref 5–8)
PLATELET # BLD AUTO: 290 K/UL (ref 150–350)
PMV BLD AUTO: 9.5 FL (ref 9.2–12.9)
POTASSIUM SERPL-SCNC: 3.5 MMOL/L (ref 3.5–5.1)
PROCALCITONIN SERPL IA-MCNC: 0.48 NG/ML
PROT SERPL-MCNC: 7.4 G/DL (ref 6–8.4)
PROT UR QL STRIP: NEGATIVE
PROTHROMBIN TIME: 10 SEC (ref 9–12.5)
RBC # BLD AUTO: 4.42 M/UL (ref 4–5.4)
RBC #/AREA URNS HPF: 4 /HPF (ref 0–4)
SODIUM SERPL-SCNC: 135 MMOL/L (ref 136–145)
SP GR UR STRIP: <=1.005 (ref 1–1.03)
SPECIMEN SOURCE: NORMAL
SQUAMOUS #/AREA URNS HPF: 3 /HPF
URN SPEC COLLECT METH UR: ABNORMAL
UROBILINOGEN UR STRIP-ACNC: NEGATIVE EU/DL
WBC # BLD AUTO: 17.17 K/UL (ref 3.9–12.7)

## 2019-05-15 PROCEDURE — 63600175 PHARM REV CODE 636 W HCPCS: Performed by: EMERGENCY MEDICINE

## 2019-05-15 PROCEDURE — 83605 ASSAY OF LACTIC ACID: CPT

## 2019-05-15 PROCEDURE — 87040 BLOOD CULTURE FOR BACTERIA: CPT

## 2019-05-15 PROCEDURE — 25500020 PHARM REV CODE 255: Performed by: EMERGENCY MEDICINE

## 2019-05-15 PROCEDURE — 93010 EKG 12-LEAD: ICD-10-PCS | Mod: ,,, | Performed by: INTERNAL MEDICINE

## 2019-05-15 PROCEDURE — 85610 PROTHROMBIN TIME: CPT

## 2019-05-15 PROCEDURE — 84145 PROCALCITONIN (PCT): CPT

## 2019-05-15 PROCEDURE — 25000003 PHARM REV CODE 250: Performed by: EMERGENCY MEDICINE

## 2019-05-15 PROCEDURE — 93005 ELECTROCARDIOGRAM TRACING: CPT

## 2019-05-15 PROCEDURE — 81025 URINE PREGNANCY TEST: CPT | Performed by: EMERGENCY MEDICINE

## 2019-05-15 PROCEDURE — 96365 THER/PROPH/DIAG IV INF INIT: CPT

## 2019-05-15 PROCEDURE — 81000 URINALYSIS NONAUTO W/SCOPE: CPT

## 2019-05-15 PROCEDURE — 80053 COMPREHEN METABOLIC PANEL: CPT

## 2019-05-15 PROCEDURE — 93010 ELECTROCARDIOGRAM REPORT: CPT | Mod: ,,, | Performed by: INTERNAL MEDICINE

## 2019-05-15 PROCEDURE — 85025 COMPLETE CBC W/AUTO DIFF WBC: CPT

## 2019-05-15 PROCEDURE — 96361 HYDRATE IV INFUSION ADD-ON: CPT

## 2019-05-15 PROCEDURE — 99291 CRITICAL CARE FIRST HOUR: CPT | Mod: 25

## 2019-05-15 PROCEDURE — 12000002 HC ACUTE/MED SURGE SEMI-PRIVATE ROOM

## 2019-05-15 PROCEDURE — 84702 CHORIONIC GONADOTROPIN TEST: CPT

## 2019-05-15 PROCEDURE — 87502 INFLUENZA DNA AMP PROBE: CPT

## 2019-05-15 PROCEDURE — 96375 TX/PRO/DX INJ NEW DRUG ADDON: CPT

## 2019-05-15 RX ORDER — KETOROLAC TROMETHAMINE 30 MG/ML
15 INJECTION, SOLUTION INTRAMUSCULAR; INTRAVENOUS
Status: COMPLETED | OUTPATIENT
Start: 2019-05-15 | End: 2019-05-15

## 2019-05-15 RX ORDER — SODIUM CHLORIDE 9 MG/ML
1000 INJECTION, SOLUTION INTRAVENOUS
Status: COMPLETED | OUTPATIENT
Start: 2019-05-15 | End: 2019-05-15

## 2019-05-15 RX ORDER — ACETAMINOPHEN 500 MG
1000 TABLET ORAL
Status: COMPLETED | OUTPATIENT
Start: 2019-05-15 | End: 2019-05-15

## 2019-05-15 RX ORDER — ONDANSETRON 2 MG/ML
8 INJECTION INTRAMUSCULAR; INTRAVENOUS
Status: COMPLETED | OUTPATIENT
Start: 2019-05-15 | End: 2019-05-15

## 2019-05-15 RX ADMIN — KETOROLAC TROMETHAMINE 15 MG: 30 INJECTION, SOLUTION INTRAMUSCULAR at 06:05

## 2019-05-15 RX ADMIN — PIPERACILLIN AND TAZOBACTAM 4.5 G: 4; .5 INJECTION, POWDER, LYOPHILIZED, FOR SOLUTION INTRAVENOUS; PARENTERAL at 05:05

## 2019-05-15 RX ADMIN — SODIUM CHLORIDE 2448 ML: 0.9 INJECTION, SOLUTION INTRAVENOUS at 05:05

## 2019-05-15 RX ADMIN — VANCOMYCIN HYDROCHLORIDE 2000 MG: 100 INJECTION, POWDER, LYOPHILIZED, FOR SOLUTION INTRAVENOUS at 11:05

## 2019-05-15 RX ADMIN — ACETAMINOPHEN 1000 MG: 500 TABLET ORAL at 05:05

## 2019-05-15 RX ADMIN — ONDANSETRON 8 MG: 2 INJECTION INTRAMUSCULAR; INTRAVENOUS at 05:05

## 2019-05-15 RX ADMIN — IOHEXOL 100 ML: 350 INJECTION, SOLUTION INTRAVENOUS at 06:05

## 2019-05-15 RX ADMIN — SODIUM CHLORIDE 1000 ML: 0.9 INJECTION, SOLUTION INTRAVENOUS at 11:05

## 2019-05-15 NOTE — ED PROVIDER NOTES
Encounter Date: 5/15/2019    SCRIBE #1 NOTE: I, Celia Eugene, am scribing for, and in the presence of, Dr. Tony.       History     Chief Complaint   Patient presents with    Abdominal Pain     LLQ pains w/ new onset yesteday. + reported fever of 102.3 Friend states he gave her prescriptions.+ HA.      Time seen by provider: 5:27 PM    This is a 33 y.o. female who presents with complaint of abdominal pain that began yesterday. Pain is located to LLQ. Patient recently got back from [place] two days ago. She reports fever, nausea, vomiting, and headache. She denies chills, diarrhea, constipation, chest pain, SOB, neck pain, dizziness, light headedness, numbness, weakness, dysuria, or vaginal discharge. She reports having a bowel movement yesterday. She denies any sick contacts. Patient's friend reports he prescribed her Levaquin and Flagyl today. Patient reports undergoing abdominal surgery secondary to endometriosis.    The history is provided by the patient and a friend.     Review of patient's allergies indicates:   Allergen Reactions    Latex, natural rubber     Pepto-bismol [bismuth subsalicylate]      Facial swelling       Past Medical History:   Diagnosis Date    Abnormal Pap smear of cervix 2017    Endometriosis in scar 2013    umbilicus     Past Surgical History:   Procedure Laterality Date    Excision of umbilical endometriosis  2014    Excision scar      PELVIC LAPAROSCOPY  2013    In [place]     Family History   Problem Relation Age of Onset    Breast cancer Neg Hx     Colon cancer Neg Hx     Ovarian cancer Neg Hx      Social History     Tobacco Use    Smoking status: Current Every Day Smoker     Types: Cigarettes    Smokeless tobacco: Never Used   Substance Use Topics    Alcohol use: Yes     Comment: 1 time a week     Drug use: No     Review of Systems   Constitutional: Positive for fever. Negative for chills.   HENT: Negative for congestion, sore throat and trouble swallowing.    Eyes:  Negative for photophobia and visual disturbance.   Respiratory: Negative for shortness of breath.    Cardiovascular: Negative for chest pain.   Gastrointestinal: Positive for abdominal pain, nausea and vomiting. Negative for constipation and diarrhea.   Genitourinary: Negative for dysuria, hematuria and vaginal discharge.   Musculoskeletal: Negative for back pain and neck pain.   Skin: Negative for rash and wound.   Neurological: Positive for headaches. Negative for dizziness, weakness, light-headedness and numbness.   Psychiatric/Behavioral: Negative.        Physical Exam     Initial Vitals [05/15/19 1721]   BP Pulse Resp Temp SpO2   (!) 89/49 96 16 (!) 102 °F (38.9 °C) 98 %      MAP       --         Physical Exam    Nursing note and vitals reviewed.  Constitutional: She appears well-developed and well-nourished. She appears distressed.   Appears uncomfortable.    HENT:   Head: Normocephalic and atraumatic.   Mouth/Throat: Mucous membranes are dry.   Eyes: Conjunctivae and EOM are normal. Pupils are equal, round, and reactive to light.   Neck: Normal range of motion. Neck supple.   No meningismus.   Cardiovascular: Regular rhythm and normal heart sounds. Tachycardia present.  Exam reveals no gallop and no friction rub.    No murmur heard.  Pulmonary/Chest: Breath sounds normal. No respiratory distress. She has no wheezes. She has no rhonchi. She has no rales.   Abdominal: Soft. There is no tenderness. There is no rebound and no guarding.   Tenderness to LLQ on deep palpation.   Musculoskeletal: Normal range of motion. She exhibits no edema or tenderness.   Neurological: She is alert and oriented to person, place, and time. She has normal strength.   Skin: There is pallor.   Psychiatric: She has a normal mood and affect. Her behavior is normal. Judgment and thought content normal.         ED Course   Critical Care  Date/Time: 5/15/2019 11:05 PM  Performed by: Love Tony MD  Authorized by: Love Tony MD    Total critical care time (exclusive of procedural time) : 60 minutes  Critical care was necessary to treat or prevent imminent or life-threatening deterioration of the following conditions: sepsis.  Critical care was time spent personally by me on the following activities: examination of patient, ordering and review of radiographic studies, re-evaluation of patient's condition, pulse oximetry, ordering and review of laboratory studies, obtaining history from patient or surrogate and evaluation of patient's response to treatment.        Labs Reviewed   CBC W/ AUTO DIFFERENTIAL - Abnormal; Notable for the following components:       Result Value    WBC 17.17 (*)     Hematocrit 36.7 (*)     Gran # (ANC) 15.0 (*)     Gran% 86.9 (*)     Lymph% 7.6 (*)     All other components within normal limits   COMPREHENSIVE METABOLIC PANEL - Abnormal; Notable for the following components:    Sodium 135 (*)     Glucose 116 (*)     All other components within normal limits   URINALYSIS, REFLEX TO URINE CULTURE - Abnormal; Notable for the following components:    Specific Gravity, UA <=1.005 (*)     Occult Blood UA 1+ (*)     All other components within normal limits    Narrative:     Preferred Collection Type->Urine, Clean Catch   PROCALCITONIN - Abnormal; Notable for the following components:    Procalcitonin 0.48 (*)     All other components within normal limits   INFLUENZA A & B BY MOLECULAR   CULTURE, BLOOD   CULTURE, BLOOD   LACTIC ACID, PLASMA   LACTIC ACID, PLASMA   PROTIME-INR   HCG, QUANTITATIVE, PREGNANCY   URINALYSIS MICROSCOPIC    Narrative:     Preferred Collection Type->Urine, Clean Catch   POCT URINE PREGNANCY     EKG Readings: (Independently Interpreted)   Initial Reading: No STEMI.   Normal sinus rhythm at a rate of 88 bpm. Normal axis. Narrow QRS. T waves inverted in leads III and aVF.        Imaging Results          US Pelvis Comp with Transvag NON-OB (xpd (Final result)  Result time 05/15/19 22:11:39    Final  result by Radha Snow MD (05/15/19 22:11:39)                 Impression:      Small anterior uterine body fibroid.    Vascular flow to both ovaries.      Electronically signed by: Radha Snow  Date:    05/15/2019  Time:    22:11             Narrative:    EXAMINATION:  PELVIC ULTRASOUND    CLINICAL HISTORY:  ro toa;    TECHNIQUE:  Real-time ultrasound of the pelvis was performed transabdominally and transvaginally.    COMPARISON:  01/31/2018    FINDINGS:  The uterus measures 7.2 x 3.9 x 5.6.  The endometrial stripe measures 4 mm.  There is a right small anterior uterine body fibroid measuring 9 x 7 x 11 mm.  This was not visualized on the previous examination.    The right ovary measures 3.4 x 2.2 x 2.5 cm. Vascular flow is seen in the right ovary.    The left ovary measures 3.8 x 1.0 x 4.1 cm. Vascular flow is seen in the left ovary    There is no free fluid in the pelvis.                               CT Abdomen Pelvis With Contrast (Final result)  Result time 05/15/19 18:46:20    Final result by Maynor Robertson MD (05/15/19 18:46:20)                 Impression:      1. Findings suggesting hepatic steatosis, correlation with LFTs recommended.  2. Probable nabothian cyst.  3. 1-2 mm pulmonary nodule along the fissure on the right, finding is nonspecific, likely of little clinical significance.  One year follow-up if warranted.  4. Several additional findings above.      Electronically signed by: Maynor Robertson MD  Date:    05/15/2019  Time:    18:46             Narrative:    EXAMINATION:  CT ABDOMEN PELVIS WITH CONTRAST    CLINICAL HISTORY:  Abd pain, fever, abscess suspected;    TECHNIQUE:  Low dose axial images, sagittal and coronal reformations were obtained from the lung bases to the pubic symphysis following the IV administration of 100 mL of Omnipaque 350 .  Oral contrast was not given.    COMPARISON:  None.    FINDINGS:  Images of the lower thorax are remarkable for a 1-2 mm pulmonary nodule  along the fissure on the right.  There is bilateral basilar dependent atelectasis.    The liver is hypoattenuating, suggesting steatosis, correlation with LFTs recommended.  The spleen, pancreas, gallbladder and adrenal glands are unremarkable.  There is no biliary dilation or ascites.  The pancreatic duct is not dilated.  Stomach is decompressed.  The portal vein, splenic vein, SMV, celiac axis and SMA all are patent.  No significant abdominal lymphadenopathy.    The kidneys enhance symmetrically without hydronephrosis or nephrolithiasis.  The bilateral ureters are unremarkable without calculi seen.  The urinary bladder is unremarkable.  The uterus and adnexa is grossly unremarkable.  There is a small amount of free fluid in the pelvis.  There is a probable nabothian cyst.    There are a few scattered colonic diverticula without inflammation.  There is moderate stool in the colon.  The terminal ileum and appendix are unremarkable.  The small bowel is grossly unremarkable.  No focal organized pelvic fluid collection.    No focal osseous destructive process.  No significant inguinal lymphadenopathy.                               X-Ray Chest AP Portable (Final result)  Result time 05/15/19 18:43:30    Final result by Alber Mendenhall MD (05/15/19 18:43:30)                 Impression:      No acute cardiopulmonary process.  No interval worsening.      Electronically signed by: Alber Mendenhall MD  Date:    05/15/2019  Time:    18:43             Narrative:    EXAMINATION:  XR CHEST AP PORTABLE    CLINICAL HISTORY:  Sepsis;    TECHNIQUE:  Single frontal view of the chest was performed.    COMPARISON:  5/22/18.    FINDINGS:  Trachea is patent. Cardiac silhouette appears unchanged. Lung volumes are low. There is no consolidation, effusion, pneumothorax or free air below the diaphragm. There is no acute osseous abnormality.                              X-Rays:   Independently Interpreted Readings:   Chest X-Ray: No  infiltrates. No cardiomegaly. No pneumothorax.      Medical Decision Making:   Initial Assessment:   Urgent evaluation of 33-year-old female here with her friend given concern for high fever, generalized malaise, left lower quadrant pain.  Patient just returned from a 1 week trip in [place] recently, and has since developed nausea, vomiting and left lower quadrant pain. Patient was prescribed Levaquin and Flagyl by a friend, which she took 1 dose today.  On exam patient is febrile, hypotensive, not hypoxic, heart rate 96, but appears unwell, diaphoretic, no meningismus, tenderness to left lower quadrant.  Patient denies diarrhea, blood in stool, no dysuria.  Broad differential includes UTI, diverticulitis, PID, lower suspicion for appendicitis, pyelonephritis, bacteremia, meningitis.  Independently Interpreted Test(s):   I have ordered and independently interpreted X-rays - see prior notes.  I have ordered and independently interpreted EKG Reading(s) - see prior notes  Clinical Tests:   Lab Tests: Ordered and Reviewed  Radiological Study: Ordered and Reviewed  Medical Tests: Ordered and Reviewed  Sepsis Perfusion Assessment: I attest, a sepsis perfusion exam was performed within 6 hours of Septic Shock presentation, following fluid resuscitation.  ED Management:  Extensive infectious workup obtained, and without obvious etiology for her fevers or leukocytosis.  No UTI, influenza negative, leukocytosis present, initial lactate 1.7, procalcitonin 0.48, CT abdomen performed without intra-abdominal abscess, pelvic ultrasound obtained, without TOA.  Discussed with the patient my rec for LP given FUO, but pt not desiring at this time upon discussion with her family. We discussed inability to fully evaluate for possible menningitis and pt agreed to stay in ED for continued obs/fu Cx. Pt continues to have low grade hypotension 94/55 but without evidence of shock- no prolonged cap refill, nml mentation                Scribe  Attestation:   Scribe #1: I performed the above scribed service and the documentation accurately describes the services I performed. I attest to the accuracy of the note.    Attending Attestation:           Physician Attestation for Scribe:  Physician Attestation Statement for Scribe #1: I, Dr. Tony, reviewed documentation, as scribed by Celia Eugene in my presence, and it is both accurate and complete.                    Clinical Impression:     1. Sepsis    2. Leukocytosis, unspecified type    3. Fever, unspecified fever cause    4. Hypotension, unspecified hypotension type          Disposition:   Disposition: Admitted  Condition: Aniyah Tony MD  05/15/19 8669

## 2019-05-15 NOTE — LETTER
May 17, 2019         [address]  [place] [address]  Phone: [phone]  Fax: [phone]       Patient: Marla Rothman   YOB: 1985  Date of Visit: 05/17/2019    To Whom It May Concern:    Kimberly Rothman  was at [facility] from 05/15/2019 to 05/17/2019. She may return to work/school on 05/18/2019 with no restrictions. If you have any questions or concerns, or if I can be of further assistance, please do not hesitate to contact me.    Sincerely,    Obdulia Evans RN

## 2019-05-15 NOTE — ED TRIAGE NOTES
Pt reports returning from [place] yesterday. Reports onset of abd pain last night. Reports pain returned today and hasn't gone away. Reports fever. Denies taking any antipyretics today. Reports generalized body aches with headache. Denies being pregnant. Reports some nausea.

## 2019-05-16 LAB
ANION GAP SERPL CALC-SCNC: 6 MMOL/L (ref 8–16)
BASOPHILS # BLD AUTO: 0 K/UL (ref 0–0.2)
BASOPHILS NFR BLD: 0 % (ref 0–1.9)
BUN SERPL-MCNC: 6 MG/DL (ref 6–20)
CALCIUM SERPL-MCNC: 7.9 MG/DL (ref 8.7–10.5)
CHLORIDE SERPL-SCNC: 109 MMOL/L (ref 95–110)
CO2 SERPL-SCNC: 21 MMOL/L (ref 23–29)
CREAT SERPL-MCNC: 0.7 MG/DL (ref 0.5–1.4)
DIFFERENTIAL METHOD: ABNORMAL
EOSINOPHIL # BLD AUTO: 0 K/UL (ref 0–0.5)
EOSINOPHIL NFR BLD: 0.3 % (ref 0–8)
ERYTHROCYTE [DISTWIDTH] IN BLOOD BY AUTOMATED COUNT: 13.5 % (ref 11.5–14.5)
EST. GFR  (AFRICAN AMERICAN): >60 ML/MIN/1.73 M^2
EST. GFR  (NON AFRICAN AMERICAN): >60 ML/MIN/1.73 M^2
GLUCOSE SERPL-MCNC: 106 MG/DL (ref 70–110)
HCT VFR BLD AUTO: 34 % (ref 37–48.5)
HGB BLD-MCNC: 11.2 G/DL (ref 12–16)
LYMPHOCYTES # BLD AUTO: 0.8 K/UL (ref 1–4.8)
LYMPHOCYTES NFR BLD: 10.4 % (ref 18–48)
MCH RBC QN AUTO: 28.1 PG (ref 27–31)
MCHC RBC AUTO-ENTMCNC: 32.9 G/DL (ref 32–36)
MCV RBC AUTO: 85 FL (ref 82–98)
MONOCYTES # BLD AUTO: 0.2 K/UL (ref 0.3–1)
MONOCYTES NFR BLD: 2.6 % (ref 4–15)
NEUTROPHILS # BLD AUTO: 6.3 K/UL (ref 1.8–7.7)
NEUTROPHILS NFR BLD: 86.6 % (ref 38–73)
PLATELET # BLD AUTO: 207 K/UL (ref 150–350)
PMV BLD AUTO: 9.4 FL (ref 9.2–12.9)
POTASSIUM SERPL-SCNC: 3.3 MMOL/L (ref 3.5–5.1)
RBC # BLD AUTO: 3.98 M/UL (ref 4–5.4)
SODIUM SERPL-SCNC: 136 MMOL/L (ref 136–145)
WBC # BLD AUTO: 7.32 K/UL (ref 3.9–12.7)

## 2019-05-16 PROCEDURE — 63600175 PHARM REV CODE 636 W HCPCS: Performed by: EMERGENCY MEDICINE

## 2019-05-16 PROCEDURE — 25000003 PHARM REV CODE 250: Performed by: INTERNAL MEDICINE

## 2019-05-16 PROCEDURE — 25000003 PHARM REV CODE 250: Performed by: EMERGENCY MEDICINE

## 2019-05-16 PROCEDURE — 36415 COLL VENOUS BLD VENIPUNCTURE: CPT

## 2019-05-16 PROCEDURE — 99223 1ST HOSP IP/OBS HIGH 75: CPT | Mod: ,,, | Performed by: PHYSICIAN ASSISTANT

## 2019-05-16 PROCEDURE — 11000001 HC ACUTE MED/SURG PRIVATE ROOM

## 2019-05-16 PROCEDURE — 85025 COMPLETE CBC W/AUTO DIFF WBC: CPT

## 2019-05-16 PROCEDURE — 99223 PR INITIAL HOSPITAL CARE,LEVL III: ICD-10-PCS | Mod: ,,, | Performed by: PHYSICIAN ASSISTANT

## 2019-05-16 PROCEDURE — 25000003 PHARM REV CODE 250: Performed by: PHYSICIAN ASSISTANT

## 2019-05-16 PROCEDURE — 63600175 PHARM REV CODE 636 W HCPCS: Performed by: PHYSICIAN ASSISTANT

## 2019-05-16 PROCEDURE — 80048 BASIC METABOLIC PNL TOTAL CA: CPT

## 2019-05-16 PROCEDURE — 63600175 PHARM REV CODE 636 W HCPCS: Performed by: INTERNAL MEDICINE

## 2019-05-16 PROCEDURE — 94761 N-INVAS EAR/PLS OXIMETRY MLT: CPT

## 2019-05-16 RX ORDER — SODIUM CHLORIDE 0.9 % (FLUSH) 0.9 %
10 SYRINGE (ML) INJECTION
Status: DISCONTINUED | OUTPATIENT
Start: 2019-05-16 | End: 2019-05-17 | Stop reason: HOSPADM

## 2019-05-16 RX ORDER — LINEZOLID 2 MG/ML
600 INJECTION, SOLUTION INTRAVENOUS
Status: DISCONTINUED | OUTPATIENT
Start: 2019-05-16 | End: 2019-05-17 | Stop reason: HOSPADM

## 2019-05-16 RX ORDER — POTASSIUM CHLORIDE 20 MEQ/15ML
40 SOLUTION ORAL ONCE
Status: COMPLETED | OUTPATIENT
Start: 2019-05-16 | End: 2019-05-16

## 2019-05-16 RX ORDER — ACETAMINOPHEN 325 MG/1
650 TABLET ORAL EVERY 8 HOURS PRN
Status: DISCONTINUED | OUTPATIENT
Start: 2019-05-16 | End: 2019-05-16

## 2019-05-16 RX ORDER — HYDROCODONE BITARTRATE AND ACETAMINOPHEN 5; 325 MG/1; MG/1
1 TABLET ORAL EVERY 6 HOURS PRN
Status: DISCONTINUED | OUTPATIENT
Start: 2019-05-16 | End: 2019-05-17 | Stop reason: HOSPADM

## 2019-05-16 RX ORDER — ACETAMINOPHEN 325 MG/1
650 TABLET ORAL EVERY 4 HOURS PRN
Status: DISCONTINUED | OUTPATIENT
Start: 2019-05-16 | End: 2019-05-17 | Stop reason: HOSPADM

## 2019-05-16 RX ORDER — DIPHENHYDRAMINE HYDROCHLORIDE 50 MG/ML
25 INJECTION INTRAMUSCULAR; INTRAVENOUS ONCE
Status: COMPLETED | OUTPATIENT
Start: 2019-05-16 | End: 2019-05-16

## 2019-05-16 RX ORDER — DIPHENHYDRAMINE HCL 25 MG
25 CAPSULE ORAL EVERY 6 HOURS PRN
Status: DISCONTINUED | OUTPATIENT
Start: 2019-05-16 | End: 2019-05-17 | Stop reason: HOSPADM

## 2019-05-16 RX ORDER — ONDANSETRON 2 MG/ML
4 INJECTION INTRAMUSCULAR; INTRAVENOUS EVERY 8 HOURS PRN
Status: DISCONTINUED | OUTPATIENT
Start: 2019-05-16 | End: 2019-05-17 | Stop reason: HOSPADM

## 2019-05-16 RX ORDER — SODIUM CHLORIDE 9 MG/ML
INJECTION, SOLUTION INTRAVENOUS CONTINUOUS
Status: DISCONTINUED | OUTPATIENT
Start: 2019-05-16 | End: 2019-05-16

## 2019-05-16 RX ADMIN — LINEZOLID 600 MG: 600 INJECTION, SOLUTION INTRAVENOUS at 05:05

## 2019-05-16 RX ADMIN — ACETAMINOPHEN 650 MG: 325 TABLET, FILM COATED ORAL at 05:05

## 2019-05-16 RX ADMIN — PIPERACILLIN AND TAZOBACTAM 4.5 G: 4; .5 INJECTION, POWDER, LYOPHILIZED, FOR SOLUTION INTRAVENOUS; PARENTERAL at 10:05

## 2019-05-16 RX ADMIN — PIPERACILLIN AND TAZOBACTAM 4.5 G: 4; .5 INJECTION, POWDER, LYOPHILIZED, FOR SOLUTION INTRAVENOUS; PARENTERAL at 07:05

## 2019-05-16 RX ADMIN — DIPHENHYDRAMINE HYDROCHLORIDE 25 MG: 50 INJECTION, SOLUTION INTRAMUSCULAR; INTRAVENOUS at 01:05

## 2019-05-16 RX ADMIN — PIPERACILLIN AND TAZOBACTAM 4.5 G: 4; .5 INJECTION, POWDER, LYOPHILIZED, FOR SOLUTION INTRAVENOUS; PARENTERAL at 03:05

## 2019-05-16 RX ADMIN — DIPHENHYDRAMINE HYDROCHLORIDE 25 MG: 25 CAPSULE ORAL at 03:05

## 2019-05-16 RX ADMIN — VANCOMYCIN HYDROCHLORIDE 1500 MG: 1 INJECTION, POWDER, LYOPHILIZED, FOR SOLUTION INTRAVENOUS at 02:05

## 2019-05-16 RX ADMIN — POTASSIUM CHLORIDE 40 MEQ: 40 SOLUTION ORAL at 08:05

## 2019-05-16 RX ADMIN — SODIUM CHLORIDE 1000 ML: 0.9 INJECTION, SOLUTION INTRAVENOUS at 03:05

## 2019-05-16 NOTE — NURSING
Patient complaining of itching, and tingling/swelling in lips, this is about 1 hour after Vanc was started.  Patient stated her arm started hurting at start of infusion.  Dr. Hu notified.  Tried slowing infusion but patient unable to tolerate, so disconnected.   MD. discontinued order.

## 2019-05-16 NOTE — SUBJECTIVE & OBJECTIVE
Past Medical History:   Diagnosis Date    Abnormal Pap smear of cervix 2017    Endometriosis in scar 2013    umbilicus       Past Surgical History:   Procedure Laterality Date    Excision of umbilical endometriosis  2014    Excision scar      PELVIC LAPAROSCOPY  2013    In [place]       Review of patient's allergies indicates:   Allergen Reactions    Latex, natural rubber     Pepto-bismol [bismuth subsalicylate]      Facial swelling         No current facility-administered medications on file prior to encounter.      Current Outpatient Medications on File Prior to Encounter   Medication Sig    etonogestrel-ethinyl estradiol (NUVARING) 0.12-0.015 mg/24 hr vaginal ring Place 1 each vaginally every 21 days.     Family History     None        Tobacco Use    Smoking status: Current Every Day Smoker     Types: Cigarettes    Smokeless tobacco: Never Used   Substance and Sexual Activity    Alcohol use: Yes     Comment: 1 time a week     Drug use: No    Sexual activity: Not Currently     Partners: Male     Birth control/protection: None     Review of Systems   Constitutional: Positive for fever. Negative for activity change, appetite change, chills and diaphoresis.   HENT: Negative for congestion, ear pain, postnasal drip, rhinorrhea, sinus pressure, sore throat and trouble swallowing.    Respiratory: Negative for cough, shortness of breath and wheezing.    Cardiovascular: Negative for chest pain and palpitations.   Gastrointestinal: Negative for abdominal pain, constipation, diarrhea, nausea and vomiting.   Genitourinary: Negative for dysuria, flank pain, frequency, hematuria and urgency.   Musculoskeletal: Negative for arthralgias, back pain, myalgias, neck pain and neck stiffness.   Skin: Negative for color change, pallor, rash and wound.   Neurological: Positive for dizziness and headaches. Negative for syncope, weakness and light-headedness.   Psychiatric/Behavioral: Negative for confusion and decreased  concentration.     Objective:     Vital Signs (Most Recent):  Temp: (!) 101.4 °F (38.6 °C) (05/16/19 0628)  Pulse: 92 (05/16/19 0530)  Resp: 18 (05/16/19 0530)  BP: (!) 106/59 (05/16/19 0530)  SpO2: 98 % (05/16/19 0530) Vital Signs (24h Range):  Temp:  [98.5 °F (36.9 °C)-103.4 °F (39.7 °C)] 101.4 °F (38.6 °C)  Pulse:  [70-96] 92  Resp:  [16-20] 18  SpO2:  [96 %-100 %] 98 %  BP: ()/(48-65) 106/59     Weight: 81.6 kg (180 lb)  Body mass index is 29.95 kg/m².    Physical Exam   Constitutional: She is oriented to person, place, and time. She appears well-developed and well-nourished. No distress.   HENT:   Head: Normocephalic and atraumatic.   Eyes: Pupils are equal, round, and reactive to light. Conjunctivae and EOM are normal. No scleral icterus.   Neck: Normal range of motion. Neck supple. No tracheal deviation present.   Cardiovascular: Normal rate, regular rhythm, normal heart sounds and intact distal pulses. Exam reveals no gallop and no friction rub.   No murmur heard.  Pulmonary/Chest: Effort normal and breath sounds normal. No stridor. No respiratory distress. She has no wheezes. She has no rales.   Abdominal: Soft. Bowel sounds are normal. She exhibits no distension. There is no tenderness. There is no guarding.   Neurological: She is alert and oriented to person, place, and time. No cranial nerve deficit. She exhibits normal muscle tone.   Skin: Skin is warm and dry. She is not diaphoretic. No erythema.   Psychiatric: She has a normal mood and affect. Her behavior is normal. Judgment and thought content normal.   Nursing note and vitals reviewed.        CRANIAL NERVES     CN III, IV, VI   Pupils are equal, round, and reactive to light.  Extraocular motions are normal.        Significant Labs:   BMP:   Recent Labs   Lab 05/16/19 0526         K 3.3*      CO2 21*   BUN 6   CREATININE 0.7   CALCIUM 7.9*     CBC:   Recent Labs   Lab 05/15/19  1732 05/16/19  0526   WBC 17.17* 7.32   HGB  12.6 11.2*   HCT 36.7* 34.0*    207     CMP:   Recent Labs   Lab 05/15/19  1732 05/16/19  0526   * 136   K 3.5 3.3*    109   CO2 24 21*   * 106   BUN 9 6   CREATININE 0.8 0.7   CALCIUM 9.4 7.9*   PROT 7.4  --    ALBUMIN 3.7  --    BILITOT 0.4  --    ALKPHOS 68  --    AST 13  --    ALT 17  --    ANIONGAP 9 6*   EGFRNONAA >60 >60     Urine Culture: No results for input(s): LABURIN in the last 48 hours.  Urine Studies:   Recent Labs   Lab 05/15/19  1928   COLORU Yellow   APPEARANCEUA Clear   PHUR 7.0   SPECGRAV <=1.005*   PROTEINUA Negative   GLUCUA Negative   KETONESU Negative   BILIRUBINUA Negative   OCCULTUA 1+*   NITRITE Negative   UROBILINOGEN Negative   LEUKOCYTESUR Negative   RBCUA 4   BACTERIA Rare   SQUAMEPITHEL 3     All pertinent labs within the past 24 hours have been reviewed.    Significant Imaging: I have reviewed all pertinent imaging results/findings within the past 24 hours.   Imaging Results          US Pelvis Comp with Transvag NON-OB (xpd (Final result)  Result time 05/15/19 22:11:39    Final result by Radha Snow MD (05/15/19 22:11:39)                 Impression:      Small anterior uterine body fibroid.    Vascular flow to both ovaries.      Electronically signed by: Radha Snow  Date:    05/15/2019  Time:    22:11             Narrative:    EXAMINATION:  PELVIC ULTRASOUND    CLINICAL HISTORY:  ro toa;    TECHNIQUE:  Real-time ultrasound of the pelvis was performed transabdominally and transvaginally.    COMPARISON:  01/31/2018    FINDINGS:  The uterus measures 7.2 x 3.9 x 5.6.  The endometrial stripe measures 4 mm.  There is a right small anterior uterine body fibroid measuring 9 x 7 x 11 mm.  This was not visualized on the previous examination.    The right ovary measures 3.4 x 2.2 x 2.5 cm. Vascular flow is seen in the right ovary.    The left ovary measures 3.8 x 1.0 x 4.1 cm. Vascular flow is seen in the left ovary    There is no free fluid in the  pelvis.                               CT Abdomen Pelvis With Contrast (Final result)  Result time 05/15/19 18:46:20    Final result by Maynor Robertson MD (05/15/19 18:46:20)                 Impression:      1. Findings suggesting hepatic steatosis, correlation with LFTs recommended.  2. Probable nabothian cyst.  3. 1-2 mm pulmonary nodule along the fissure on the right, finding is nonspecific, likely of little clinical significance.  One year follow-up if warranted.  4. Several additional findings above.      Electronically signed by: Maynor Robertson MD  Date:    05/15/2019  Time:    18:46             Narrative:    EXAMINATION:  CT ABDOMEN PELVIS WITH CONTRAST    CLINICAL HISTORY:  Abd pain, fever, abscess suspected;    TECHNIQUE:  Low dose axial images, sagittal and coronal reformations were obtained from the lung bases to the pubic symphysis following the IV administration of 100 mL of Omnipaque 350 .  Oral contrast was not given.    COMPARISON:  None.    FINDINGS:  Images of the lower thorax are remarkable for a 1-2 mm pulmonary nodule along the fissure on the right.  There is bilateral basilar dependent atelectasis.    The liver is hypoattenuating, suggesting steatosis, correlation with LFTs recommended.  The spleen, pancreas, gallbladder and adrenal glands are unremarkable.  There is no biliary dilation or ascites.  The pancreatic duct is not dilated.  Stomach is decompressed.  The portal vein, splenic vein, SMV, celiac axis and SMA all are patent.  No significant abdominal lymphadenopathy.    The kidneys enhance symmetrically without hydronephrosis or nephrolithiasis.  The bilateral ureters are unremarkable without calculi seen.  The urinary bladder is unremarkable.  The uterus and adnexa is grossly unremarkable.  There is a small amount of free fluid in the pelvis.  There is a probable nabothian cyst.    There are a few scattered colonic diverticula without inflammation.  There is moderate stool in the  colon.  The terminal ileum and appendix are unremarkable.  The small bowel is grossly unremarkable.  No focal organized pelvic fluid collection.    No focal osseous destructive process.  No significant inguinal lymphadenopathy.                               X-Ray Chest AP Portable (Final result)  Result time 05/15/19 18:43:30    Final result by Alber Mendenhall MD (05/15/19 18:43:30)                 Impression:      No acute cardiopulmonary process.  No interval worsening.      Electronically signed by: Alber Mendenhall MD  Date:    05/15/2019  Time:    18:43             Narrative:    EXAMINATION:  XR CHEST AP PORTABLE    CLINICAL HISTORY:  Sepsis;    TECHNIQUE:  Single frontal view of the chest was performed.    COMPARISON:  5/22/18.    FINDINGS:  Trachea is patent. Cardiac silhouette appears unchanged. Lung volumes are low. There is no consolidation, effusion, pneumothorax or free air below the diaphragm. There is no acute osseous abnormality.

## 2019-05-16 NOTE — PLAN OF CARE
Problem: Adult Inpatient Plan of Care  Goal: Plan of Care Review  Outcome: Ongoing (interventions implemented as appropriate)  Plan of care reviewed with patient.  Low grade temp 99.4 this PM.  Patient denies pain, denies nausea.  IV antibiotics infusing at this time.  Purposeful rounding completed, call bell within reach, no needs at this time.  Will continue to monitor.

## 2019-05-16 NOTE — HPI
Ms. Marla Rothman is a 33 y.o. female, with PMH of endometriosis w/ prior abdominal surgery, who presented to [facility] ED on 5/16/19 2/2 LLQ abdominal pain x 1 day. She arrived to the ED with a friend, who endorses he is a surgeon and prescribed her Levaquin and flagyl yesterday. She states she has had fever up to 102.3F. She endorses associated nausea, vomiting, headache, general malaise, and recent travel to [place]. She denies chills, diarrhea, constipation, chest pain, SOB, neck pain, dizziness, light headedness, numbness, weakness, dysuria, vaginal discharge. Workup showed a leukocytosis without UTI, or CXR abnormalities. A procalcitionin was elevated. A CT of the abdomen showed no acute findings. She refused LP to evaluate for meningitis. She was admitted to inpatient status.

## 2019-05-16 NOTE — PROGRESS NOTES
Pharmacokinetic Initial Assessment: IV Vancomycin    Assessment/Plan:    Initiate intravenous vancomycin with loading dose of 2000 mg once followed by a maintenance dose of vancomycin 1500mg IV every 12 hours  Desired empiric serum trough concentration is 10 to 20 mcg/mL.  Draw vancomycin trough level 30 min prior to fourth dose on 5/17/19 at approximately 1030  Pharmacy will continue to follow and monitor vancomycin.      Please contact pharmacy at extension [phone] with any questions regarding this assessment.     Thank you for the consult,   Davion Brian     Patient brief summary:  Marla Rothman is a 33 y.o. female initiated on antimicrobial therapy with IV Vancomycin for treatment of suspected bacteremia    Drug Allergies:   Review of patient's allergies indicates:   Allergen Reactions    Latex, natural rubber     Pepto-bismol [bismuth subsalicylate]      Facial swelling         Actual Body Weight:   81.6kg    Renal Function:   Estimated Creatinine Clearance: 106.5 mL/min (based on SCr of 0.8 mg/dL).,     Dialysis Method (if applicable):  n/a    CBC (last 72 hours):  Recent Labs   Lab Result Units 05/15/19  1732   WBC K/uL 17.17*   Hemoglobin g/dL 12.6   Hematocrit % 36.7*   Platelets K/uL 290   Gran% % 86.9*   Lymph% % 7.6*   Mono% % 5.0   Eosinophil% % 0.1   Basophil% % 0.1   Differential Method  Automated       Metabolic Panel (last 72 hours):  Recent Labs   Lab Result Units 05/15/19  1732 05/15/19  1928   Sodium mmol/L 135*  --    Potassium mmol/L 3.5  --    Chloride mmol/L 102  --    CO2 mmol/L 24  --    Glucose mg/dL 116*  --    Glucose, UA   --  Negative   BUN, Bld mg/dL 9  --    Creatinine mg/dL 0.8  --    Albumin g/dL 3.7  --    Total Bilirubin mg/dL 0.4  --    Alkaline Phosphatase U/L 68  --    AST U/L 13  --    ALT U/L 17  --        Drug levels (last 3 results):  No results for input(s): VANCOMYCINRA, VANCOMYCINPE, VANCOMYCINTR in the last 72 hours.    Microbiologic Results:  Microbiology  Results (last 7 days)     Procedure Component Value Units Date/Time    Blood culture x two cultures. Draw prior to antibiotics. [[id number]] Collected:  05/15/19 1731    Order Status:  Sent Specimen:  Blood from Peripheral, Antecubital, Right Updated:  05/15/19 1955    Blood culture x two cultures. Draw prior to antibiotics. [[id number]] Collected:  05/15/19 1734    Order Status:  Sent Specimen:  Blood from Peripheral, Antecubital, Left Updated:  05/15/19 1955    Influenza A & B by Molecular [[id number]] Collected:  05/15/19 1740    Order Status:  Completed Specimen:  Nasopharyngeal Swab Updated:  05/15/19 1844     Influenza A, Molecular Negative     Influenza B, Molecular Negative     Flu A & B Source Nasal Swab

## 2019-05-16 NOTE — PROGRESS NOTES
Therapy with Vancomycin complete and/or consult discontinued by provider.  Pharmacy will sign off, please re-consult as needed.

## 2019-05-16 NOTE — ASSESSMENT & PLAN NOTE
- Ms. Marla Rothman is admitted to inpatient status  - source is unclear at this time   - continue broad spectrum coverage with vancomycin and zosyn    - patient dd have reaction to vancomycin w/ redness, rash of head and upper body    - vanc infusion rate slowed and benadryl IV given    - await pending cultures   - monitor

## 2019-05-16 NOTE — H&P
[facility] Hospital Medicine  History & Physical    Patient Name: Marla Rothman  MRN: [id number]  Admission Date: 5/15/2019  Attending Physician: BRODY Hu MD   Primary Care Provider: Primary Doctor No         Patient information was obtained from patient, past medical records and ER records.     Subjective:     Principal Problem:Sepsis    Chief Complaint:   Chief Complaint   Patient presents with    Abdominal Pain     LLQ pains w/ new onset yesteday. + reported fever of 102.3 Friend states he gave her prescriptions.+ HA.         HPI: Ms. Marla Rothman is a 33 y.o. female, with PMH of endometriosis w/ prior abdominal surgery, who presented to [facility] ED on 5/16/19 2/2 LLQ abdominal pain x 1 day. She arrived to the ED with a friend, who endorses he is a surgeon and prescribed her Levaquin and flagyl yesterday. She states she has had fever up to 102.3F. She endorses associated nausea, vomiting, headache, general malaise, and recent travel to [place]. She denies chills, diarrhea, constipation, chest pain, SOB, neck pain, dizziness, light headedness, numbness, weakness, dysuria, vaginal discharge. Workup showed a leukocytosis without UTI, or CXR abnormalities. A procalcitionin was elevated. A CT of the abdomen showed no acute findings. She refused LP to evaluate for meningitis. She was admitted to inpatient status.     Past Medical History:   Diagnosis Date    Abnormal Pap smear of cervix 2017    Endometriosis in scar 2013    umbilicus       Past Surgical History:   Procedure Laterality Date    Excision of umbilical endometriosis  2014    Excision scar      PELVIC LAPAROSCOPY  2013    In [place]       Review of patient's allergies indicates:   Allergen Reactions    Latex, natural rubber     Pepto-bismol [bismuth subsalicylate]      Facial swelling         No current facility-administered medications on file prior to encounter.      Current Outpatient Medications on File  Prior to Encounter   Medication Sig    etonogestrel-ethinyl estradiol (NUVARING) 0.12-0.015 mg/24 hr vaginal ring Place 1 each vaginally every 21 days.     Family History     None        Tobacco Use    Smoking status: Current Every Day Smoker     Types: Cigarettes    Smokeless tobacco: Never Used   Substance and Sexual Activity    Alcohol use: Yes     Comment: 1 time a week     Drug use: No    Sexual activity: Not Currently     Partners: Male     Birth control/protection: None     Review of Systems   Constitutional: Positive for fever. Negative for activity change, appetite change, chills and diaphoresis.   HENT: Negative for congestion, ear pain, postnasal drip, rhinorrhea, sinus pressure, sore throat and trouble swallowing.    Respiratory: Negative for cough, shortness of breath and wheezing.    Cardiovascular: Negative for chest pain and palpitations.   Gastrointestinal: Negative for abdominal pain, constipation, diarrhea, nausea and vomiting.   Genitourinary: Negative for dysuria, flank pain, frequency, hematuria and urgency.   Musculoskeletal: Negative for arthralgias, back pain, myalgias, neck pain and neck stiffness.   Skin: Negative for color change, pallor, rash and wound.   Neurological: Positive for dizziness and headaches. Negative for syncope, weakness and light-headedness.   Psychiatric/Behavioral: Negative for confusion and decreased concentration.     Objective:     Vital Signs (Most Recent):  Temp: (!) 101.4 °F (38.6 °C) (05/16/19 0628)  Pulse: 92 (05/16/19 0530)  Resp: 18 (05/16/19 0530)  BP: (!) 106/59 (05/16/19 0530)  SpO2: 98 % (05/16/19 0530) Vital Signs (24h Range):  Temp:  [98.5 °F (36.9 °C)-103.4 °F (39.7 °C)] 101.4 °F (38.6 °C)  Pulse:  [70-96] 92  Resp:  [16-20] 18  SpO2:  [96 %-100 %] 98 %  BP: ()/(48-65) 106/59     Weight: 81.6 kg (180 lb)  Body mass index is 29.95 kg/m².    Physical Exam   Constitutional: She is oriented to person, place, and time. She appears well-developed  and well-nourished. No distress.   HENT:   Head: Normocephalic and atraumatic.   Eyes: Pupils are equal, round, and reactive to light. Conjunctivae and EOM are normal. No scleral icterus.   Neck: Normal range of motion. Neck supple. No tracheal deviation present.   Cardiovascular: Normal rate, regular rhythm, normal heart sounds and intact distal pulses. Exam reveals no gallop and no friction rub.   No murmur heard.  Pulmonary/Chest: Effort normal and breath sounds normal. No stridor. No respiratory distress. She has no wheezes. She has no rales.   Abdominal: Soft. Bowel sounds are normal. She exhibits no distension. There is no tenderness. There is no guarding.   Neurological: She is alert and oriented to person, place, and time. No cranial nerve deficit. She exhibits normal muscle tone.   Skin: Skin is warm and dry. She is not diaphoretic. No erythema.   Psychiatric: She has a normal mood and affect. Her behavior is normal. Judgment and thought content normal.   Nursing note and vitals reviewed.        CRANIAL NERVES     CN III, IV, VI   Pupils are equal, round, and reactive to light.  Extraocular motions are normal.        Significant Labs:   BMP:   Recent Labs   Lab 05/16/19  0526         K 3.3*      CO2 21*   BUN 6   CREATININE 0.7   CALCIUM 7.9*     CBC:   Recent Labs   Lab 05/15/19  1732 05/16/19  0526   WBC 17.17* 7.32   HGB 12.6 11.2*   HCT 36.7* 34.0*    207     CMP:   Recent Labs   Lab 05/15/19  1732 05/16/19  0526   * 136   K 3.5 3.3*    109   CO2 24 21*   * 106   BUN 9 6   CREATININE 0.8 0.7   CALCIUM 9.4 7.9*   PROT 7.4  --    ALBUMIN 3.7  --    BILITOT 0.4  --    ALKPHOS 68  --    AST 13  --    ALT 17  --    ANIONGAP 9 6*   EGFRNONAA >60 >60     Urine Culture: No results for input(s): LABURIN in the last 48 hours.  Urine Studies:   Recent Labs   Lab 05/15/19  1928   COLORU Yellow   APPEARANCEUA Clear   PHUR 7.0   SPECGRAV <=1.005*   PROTEINUA Negative    GLUCUA Negative   KETONESU Negative   BILIRUBINUA Negative   OCCULTUA 1+*   NITRITE Negative   UROBILINOGEN Negative   LEUKOCYTESUR Negative   RBCUA 4   BACTERIA Rare   SQUAMEPITHEL 3     All pertinent labs within the past 24 hours have been reviewed.    Significant Imaging: I have reviewed all pertinent imaging results/findings within the past 24 hours.   Imaging Results          US Pelvis Comp with Transvag NON-OB (xpd (Final result)  Result time 05/15/19 22:11:39    Final result by Radha Snow MD (05/15/19 22:11:39)                 Impression:      Small anterior uterine body fibroid.    Vascular flow to both ovaries.      Electronically signed by: Radha Snow  Date:    05/15/2019  Time:    22:11             Narrative:    EXAMINATION:  PELVIC ULTRASOUND    CLINICAL HISTORY:  ro toa;    TECHNIQUE:  Real-time ultrasound of the pelvis was performed transabdominally and transvaginally.    COMPARISON:  01/31/2018    FINDINGS:  The uterus measures 7.2 x 3.9 x 5.6.  The endometrial stripe measures 4 mm.  There is a right small anterior uterine body fibroid measuring 9 x 7 x 11 mm.  This was not visualized on the previous examination.    The right ovary measures 3.4 x 2.2 x 2.5 cm. Vascular flow is seen in the right ovary.    The left ovary measures 3.8 x 1.0 x 4.1 cm. Vascular flow is seen in the left ovary    There is no free fluid in the pelvis.                               CT Abdomen Pelvis With Contrast (Final result)  Result time 05/15/19 18:46:20    Final result by Maynor Robertson MD (05/15/19 18:46:20)                 Impression:      1. Findings suggesting hepatic steatosis, correlation with LFTs recommended.  2. Probable nabothian cyst.  3. 1-2 mm pulmonary nodule along the fissure on the right, finding is nonspecific, likely of little clinical significance.  One year follow-up if warranted.  4. Several additional findings above.      Electronically signed by: Maynor Robertson  MD  Date:    05/15/2019  Time:    18:46             Narrative:    EXAMINATION:  CT ABDOMEN PELVIS WITH CONTRAST    CLINICAL HISTORY:  Abd pain, fever, abscess suspected;    TECHNIQUE:  Low dose axial images, sagittal and coronal reformations were obtained from the lung bases to the pubic symphysis following the IV administration of 100 mL of Omnipaque 350 .  Oral contrast was not given.    COMPARISON:  None.    FINDINGS:  Images of the lower thorax are remarkable for a 1-2 mm pulmonary nodule along the fissure on the right.  There is bilateral basilar dependent atelectasis.    The liver is hypoattenuating, suggesting steatosis, correlation with LFTs recommended.  The spleen, pancreas, gallbladder and adrenal glands are unremarkable.  There is no biliary dilation or ascites.  The pancreatic duct is not dilated.  Stomach is decompressed.  The portal vein, splenic vein, SMV, celiac axis and SMA all are patent.  No significant abdominal lymphadenopathy.    The kidneys enhance symmetrically without hydronephrosis or nephrolithiasis.  The bilateral ureters are unremarkable without calculi seen.  The urinary bladder is unremarkable.  The uterus and adnexa is grossly unremarkable.  There is a small amount of free fluid in the pelvis.  There is a probable nabothian cyst.    There are a few scattered colonic diverticula without inflammation.  There is moderate stool in the colon.  The terminal ileum and appendix are unremarkable.  The small bowel is grossly unremarkable.  No focal organized pelvic fluid collection.    No focal osseous destructive process.  No significant inguinal lymphadenopathy.                               X-Ray Chest AP Portable (Final result)  Result time 05/15/19 18:43:30    Final result by Alber Mendenhall MD (05/15/19 18:43:30)                 Impression:      No acute cardiopulmonary process.  No interval worsening.      Electronically signed by: Alber Mendenhall MD  Date:    05/15/2019  Time:    18:43              Narrative:    EXAMINATION:  XR CHEST AP PORTABLE    CLINICAL HISTORY:  Sepsis;    TECHNIQUE:  Single frontal view of the chest was performed.    COMPARISON:  5/22/18.    FINDINGS:  Trachea is patent. Cardiac silhouette appears unchanged. Lung volumes are low. There is no consolidation, effusion, pneumothorax or free air below the diaphragm. There is no acute osseous abnormality.                                  Assessment/Plan:     * Sepsis  - Ms. Marla Rothman is admitted to inpatient status  - source is unclear at this time   - continue broad spectrum coverage with vancomycin and zosyn    - patient dd have reaction to vancomycin w/ redness, rash of head and upper body    - vanc infusion rate slowed and benadryl IV given    - await pending cultures   - monitor     VTE Risk Mitigation (From admission, onward)        Ordered     IP VTE LOW RISK PATIENT  Once      05/16/19 0645     Place sequential compression device  Until discontinued      05/16/19 0645             Ting Mccloud PA-C  Department of Hospital Medicine   [facility]

## 2019-05-16 NOTE — PLAN OF CARE
SW met with pt at bedside to complete discharge assessment, verified PCP and uses [facility] on [address].  Pt not sure who will drive her home upon discharge, although friend, Zandra Rosales [phone] at bedside. No needs identified at this time.  Pt may need Medicaid transportation upon discharge.     05/16/19 1223   Discharge Assessment   Assessment Type Discharge Planning Assessment   Confirmed/corrected address and phone number on facesheet? Yes   Assessment information obtained from? Patient   Communicated expected length of stay with patient/caregiver no   Prior to hospitilization cognitive status: Alert/Oriented   Prior to hospitalization functional status: Independent   Current cognitive status: Alert/Oriented   Current Functional Status: Independent   Lives With alone   Able to Return to Prior Arrangements yes   Is patient able to care for self after discharge? Unable to determine at this time (comments)   Readmission Within the Last 30 Days no previous admission in last 30 days   Patient currently being followed by outpatient case management? No   Patient currently receives any other outside agency services? No   Equipment Currently Used at Home none   Do you have any problems affording any of your prescribed medications? No   Is the patient taking medications as prescribed? yes   Does the patient have transportation home? No   Does the patient receive services at the [facility]? No   Discharge Plan A Home   DME Needed Upon Discharge  none   Patient/Family in Agreement with Plan yes

## 2019-05-16 NOTE — PLAN OF CARE
Problem: Adult Inpatient Plan of Care  Goal: Plan of Care Review  Outcome: Ongoing (interventions implemented as appropriate)  Plan of care reviewed with pt/ spouse. Pt had elevated temps throughout night, relieved mildly with prn tylenol. Pt complained of intermittent pain. Pt developed red bumps to face, neck, and chest while vancomycin infusing. Benadryl given with little relief. Pt remained free of falls. Bed locked in lowest position, call light and personal items within reach. Will continue to monitor.

## 2019-05-16 NOTE — NURSING
Received to floor a/a/o x3.  Pt with significant other in room .  Iv 20g to rac patent/ Ns and vancomycin infusing .  No adverse reaction noted from Vancomycin.  Pt educated to new antibiotic and v/o understanding to report any side effects.  Vs taken.  C/o of chills.  No c/o of pain upon assessment.  Plan of care reviewed with pt and significant other. vo understanding.

## 2019-05-16 NOTE — NURSING
Pt c/o of 'rash' to left side of face papular shaped with itching to upper body , Upper extremities and back and face.  CYRIL Bueno notified and new orders given.

## 2019-05-17 VITALS
OXYGEN SATURATION: 98 % | RESPIRATION RATE: 16 BRPM | SYSTOLIC BLOOD PRESSURE: 107 MMHG | BODY MASS INDEX: 29.99 KG/M2 | TEMPERATURE: 98 F | DIASTOLIC BLOOD PRESSURE: 65 MMHG | WEIGHT: 180 LBS | HEART RATE: 67 BPM | HEIGHT: 65 IN

## 2019-05-17 LAB
ANION GAP SERPL CALC-SCNC: 6 MMOL/L (ref 8–16)
BASOPHILS # BLD AUTO: 0.01 K/UL (ref 0–0.2)
BASOPHILS NFR BLD: 0.2 % (ref 0–1.9)
BUN SERPL-MCNC: 5 MG/DL (ref 6–20)
CALCIUM SERPL-MCNC: 8.9 MG/DL (ref 8.7–10.5)
CHLORIDE SERPL-SCNC: 109 MMOL/L (ref 95–110)
CO2 SERPL-SCNC: 23 MMOL/L (ref 23–29)
CREAT SERPL-MCNC: 0.7 MG/DL (ref 0.5–1.4)
DIFFERENTIAL METHOD: ABNORMAL
EOSINOPHIL # BLD AUTO: 0.4 K/UL (ref 0–0.5)
EOSINOPHIL NFR BLD: 7.1 % (ref 0–8)
ERYTHROCYTE [DISTWIDTH] IN BLOOD BY AUTOMATED COUNT: 13.6 % (ref 11.5–14.5)
EST. GFR  (AFRICAN AMERICAN): >60 ML/MIN/1.73 M^2
EST. GFR  (NON AFRICAN AMERICAN): >60 ML/MIN/1.73 M^2
GLUCOSE SERPL-MCNC: 107 MG/DL (ref 70–110)
HCT VFR BLD AUTO: 35.6 % (ref 37–48.5)
HGB BLD-MCNC: 11.8 G/DL (ref 12–16)
LYMPHOCYTES # BLD AUTO: 1.7 K/UL (ref 1–4.8)
LYMPHOCYTES NFR BLD: 30.5 % (ref 18–48)
MCH RBC QN AUTO: 28.3 PG (ref 27–31)
MCHC RBC AUTO-ENTMCNC: 33.1 G/DL (ref 32–36)
MCV RBC AUTO: 85 FL (ref 82–98)
MONOCYTES # BLD AUTO: 0.4 K/UL (ref 0.3–1)
MONOCYTES NFR BLD: 8 % (ref 4–15)
NEUTROPHILS # BLD AUTO: 3 K/UL (ref 1.8–7.7)
NEUTROPHILS NFR BLD: 54 % (ref 38–73)
PLATELET # BLD AUTO: 254 K/UL (ref 150–350)
PMV BLD AUTO: 9.7 FL (ref 9.2–12.9)
POTASSIUM SERPL-SCNC: 4 MMOL/L (ref 3.5–5.1)
RBC # BLD AUTO: 4.17 M/UL (ref 4–5.4)
SODIUM SERPL-SCNC: 138 MMOL/L (ref 136–145)
WBC # BLD AUTO: 5.51 K/UL (ref 3.9–12.7)

## 2019-05-17 PROCEDURE — 36415 COLL VENOUS BLD VENIPUNCTURE: CPT

## 2019-05-17 PROCEDURE — 63600175 PHARM REV CODE 636 W HCPCS: Performed by: INTERNAL MEDICINE

## 2019-05-17 PROCEDURE — 85025 COMPLETE CBC W/AUTO DIFF WBC: CPT

## 2019-05-17 PROCEDURE — 80048 BASIC METABOLIC PNL TOTAL CA: CPT

## 2019-05-17 PROCEDURE — 99232 PR SUBSEQUENT HOSPITAL CARE,LEVL II: ICD-10-PCS | Mod: ,,, | Performed by: INTERNAL MEDICINE

## 2019-05-17 PROCEDURE — 63600175 PHARM REV CODE 636 W HCPCS: Performed by: PHYSICIAN ASSISTANT

## 2019-05-17 PROCEDURE — 99239 PR HOSPITAL DISCHARGE DAY,>30 MIN: ICD-10-PCS | Mod: ,,, | Performed by: INTERNAL MEDICINE

## 2019-05-17 PROCEDURE — 25000003 PHARM REV CODE 250: Performed by: PHYSICIAN ASSISTANT

## 2019-05-17 PROCEDURE — 86788 WEST NILE VIRUS AB IGM: CPT

## 2019-05-17 PROCEDURE — 99239 HOSP IP/OBS DSCHRG MGMT >30: CPT | Mod: ,,, | Performed by: INTERNAL MEDICINE

## 2019-05-17 PROCEDURE — 99232 SBSQ HOSP IP/OBS MODERATE 35: CPT | Mod: ,,, | Performed by: INTERNAL MEDICINE

## 2019-05-17 RX ORDER — METRONIDAZOLE 500 MG/1
500 TABLET ORAL EVERY 8 HOURS
Qty: 21 TABLET | Refills: 0 | Status: SHIPPED | OUTPATIENT
Start: 2019-05-17 | End: 2019-05-24

## 2019-05-17 RX ORDER — DOXYCYCLINE HYCLATE 100 MG
100 TABLET ORAL 2 TIMES DAILY
Qty: 14 TABLET | Refills: 0 | Status: SHIPPED | OUTPATIENT
Start: 2019-05-17 | End: 2019-05-24

## 2019-05-17 RX ADMIN — PIPERACILLIN AND TAZOBACTAM 4.5 G: 4; .5 INJECTION, POWDER, LYOPHILIZED, FOR SOLUTION INTRAVENOUS; PARENTERAL at 12:05

## 2019-05-17 RX ADMIN — PIPERACILLIN AND TAZOBACTAM 4.5 G: 4; .5 INJECTION, POWDER, LYOPHILIZED, FOR SOLUTION INTRAVENOUS; PARENTERAL at 04:05

## 2019-05-17 RX ADMIN — LINEZOLID 600 MG: 600 INJECTION, SOLUTION INTRAVENOUS at 09:05

## 2019-05-17 NOTE — NURSING
Discharge instructions reviewed with patient.  Medications and follow up discussed.  IV removed.  Patient denies questions at this time.

## 2019-05-17 NOTE — CONSULTS
Consulting Physician:Consult Note  Infectious Disease    Consult Requested By:  Nathaniel Hu M.D    Reason for Consult: management recommendations      IMPRESSION:  33-year-old woman recently returned from [place] who presents with fever and leukocytosis.  She has improved significantly with IV antibiotics.  Blood cultures are negative.  She is asymptomatic.      PLAN/RECOMMENDATIONS:  Switch to doxycycline and Flagyl.  She may be discharged on oral medications for 7 days.  Follow-up with Infectious Disease as needed.    Thank you for asking me to see this patient. Please call me for any questions.      Juan Manuel Mcgraw M.D.  [phone]        SUBJECTIVE:     History of Present Illness:  33-year-old woman who presented on May 15 with fever and leukocytosis.  She states that with the onset of fever she had left lower quadrant abdominal pain.  She was prescribed ciprofloxacin and Flagyl but continued to have fever of 102.  She also complained of nausea, vomiting, headache.  She recently had traveled to [facility] for a .  She denies having any sick contacts.  She denies any animal, tick, or flea contact.  She also denies having any diarrhea.  She has no more left lower quadrant abdominal pain.  She is now afebrile, with no leukocytosis, and negative blood cultures.  I am consulted for evaluation and treatment.    Past Medical History:  Past Medical History:   Diagnosis Date    Abnormal Pap smear of cervix 2017    Endometriosis in scar 2013    umbilicus       Past Surgical History:  Past Surgical History:   Procedure Laterality Date    Excision of umbilical endometriosis  2014    Excision scar      PELVIC LAPAROSCOPY  2013    In [place]       Family History:  Family History   Problem Relation Age of Onset    Breast cancer Neg Hx     Colon cancer Neg Hx     Ovarian cancer Neg Hx        Social History:  Social History     Tobacco Use    Smoking status: Current Every Day Smoker     Types: Cigarettes     Smokeless tobacco: Never Used   Substance Use Topics    Alcohol use: Yes     Comment: 1 time a week     Drug use: No       Allergies:  Review of patient's allergies indicates:   Allergen Reactions    Vancomycin analogues Hives and Swelling    Latex, natural rubber     Pepto-bismol [bismuth subsalicylate]      Facial swelling          Pertinent Medications:  Antibiotics (From admission, onward)    Start     Stop Route Frequency Ordered    05/16/19 1730  linezolid 600mg/300ml 600 mg/300 mL IVPB 600 mg      -- IV Every 12 hours (non-standard times) 05/16/19 1622    05/16/19 0200  piperacillin-tazobactam 4.5 g in dextrose 5 % 100 mL IVPB (ready to mix system)      -- IV Every 8 hours (non-standard times) 05/16/19 0144            Review of Symptoms:  Constitutional: Denies fevers, weight loss, chills, or weakness.  Eyes: Denies changes in vision.  ENT: Denies dysphagia, nasal discharge, ear pain or discharge.  Cardiovascular: Denies chest pain, palpitations, orthopnea, or claudication.  Respiratory: Denies shortness of breath, cough, hemoptysis, or wheezing.  GI: Denies nausea/vomitting, hematochezia, melena, abd pain, or changes in appetite.  : Denies dysuria, incontinence, or hematuria.  Musculoskeletal: Denies joint pain or myalgias.  Skin/breast: Denies rashes, lumps, lesions, or discharge.  Neurologic: Denies headache, dizziness, vertigo, or paresthesias.  Psychiatric: Denies changes in mood or hallucinations.  Endocrine: Denies polyuria, polydipsia, heat/cold intolerance.  Hematologic/Lymph: Denies lymphadenopathy, easy bruising or easy bleeding.  Allergic/Immunologic: Denies rash, rhinitis.     OBJECTIVE:     Vital Signs (Most Recent)  Temp: 98.2 °F (36.8 °C) (05/17/19 1330)  Pulse: 67 (05/17/19 1330)  Resp: 16 (05/17/19 1330)  BP: 107/65 (05/17/19 1330)  SpO2: 98 % (05/17/19 1330)    Temperature Range Min/Max (Last 24H):  Temp:  [97.6 °F (36.4 °C)-99.4 °F (37.4 °C)]     Physical Exam:  Physical Exam    Constitutional: She is oriented to person, place, and time and well-developed, well-nourished, and in no distress.   HENT:   Head: Normocephalic and atraumatic.   Eyes: Pupils are equal, round, and reactive to light. Conjunctivae are normal. No scleral icterus.   Cardiovascular: Normal rate and regular rhythm.   Pulmonary/Chest: Effort normal and breath sounds normal.   Abdominal: Soft. Bowel sounds are normal. She exhibits no distension. There is no tenderness.   Musculoskeletal: Normal range of motion. She exhibits no edema.   Neurological: She is alert and oriented to person, place, and time.   Skin: Skin is warm and dry. No erythema.   Nursing note and vitals reviewed.      Laboratory:  CBC:   Lab Results   Component Value Date    WBC 5.51 05/17/2019    HGB 11.8 (L) 05/17/2019    HCT 35.6 (L) 05/17/2019    MCV 85 05/17/2019     05/17/2019       BMP:   Recent Labs   Lab 05/17/19  0504         K 4.0      CO2 23   BUN 5*   CREATININE 0.7   CALCIUM 8.9       LFT:   Lab Results   Component Value Date    ALT 17 05/15/2019    AST 13 05/15/2019    ALKPHOS 68 05/15/2019    BILITOT 0.4 05/15/2019       Microbiology x 7d:   Microbiology Results (last 7 days)     Procedure Component Value Units Date/Time    Blood culture x two cultures. Draw prior to antibiotics. [[id number]] Collected:  05/15/19 1731    Order Status:  Completed Specimen:  Blood from Peripheral, Antecubital, Right Updated:  05/16/19 2212     Blood Culture, Routine No Growth to date     Blood Culture, Routine No Growth to date    Narrative:       Aerobic and anaerobic    Blood culture x two cultures. Draw prior to antibiotics. [[id number]] Collected:  05/15/19 1734    Order Status:  Completed Specimen:  Blood from Peripheral, Antecubital, Left Updated:  05/16/19 2212     Blood Culture, Routine No Growth to date     Blood Culture, Routine No Growth to date    Narrative:       Aerobic and anaerobic    Influenza A & B by Molecular  [[id number]] Collected:  05/15/19 1740    Order Status:  Completed Specimen:  Nasopharyngeal Swab Updated:  05/15/19 1844     Influenza A, Molecular Negative     Influenza B, Molecular Negative     Flu A & B Source Nasal Swab              Diagnostic Results:  chest xray:  No acute disease.    ASSESSMENT/PLAN:     Active Hospital Problems    Diagnosis  POA    *Sepsis [A41.9]  Yes      Resolved Hospital Problems   No resolved problems to display.       Plan: Please see top of page

## 2019-05-17 NOTE — PLAN OF CARE
Problem: Adult Inpatient Plan of Care  Goal: Plan of Care Review     05/17/19 0517   Plan of Care Review   Plan of Care Reviewed With patient   Progress improving     Goal: Absence of Hospital-Acquired Illness or Injury    Intervention: Identify and Manage Fall Risk     05/17/19 0517   Optimize Balance and Safe Activity   Safety Promotion/Fall Prevention assistive device/personal item within reach;Fall Risk reviewed with patient/family;family to remain at bedside;lighting adjusted;medications reviewed;nonskid shoes/socks when out of bed         Problem: Fall Injury Risk  Goal: Absence of Fall and Fall-Related Injury    Intervention: Identify and Manage Contributors to Fall Injury Risk     05/17/19 0517   Manage Acute Allergic Reaction   Medication Review/Management medications reviewed   Identify and Manage Contributors to Fall Injury Risk   Self-Care Promotion independence encouraged

## 2019-05-17 NOTE — DISCHARGE SUMMARY
[facility] Hospital Medicine  Discharge Summary      Patient Name: Marla Rothman  MRN: [id number]  Admission Date: 5/15/2019  Hospital Length of Stay: 2 days  Discharge Date and Time:  05/18/2019 3:11 PM  Attending Physician: Pam quezada. providers found   Discharging Provider: NIKKI Hu MD  Primary Care Provider: [facility]      HPI:   Ms. Marla Rothman is a 33 y.o. female, with PMH of endometriosis w/ prior abdominal surgery, who presented to [facility] ED on 5/16/19 2/2 LLQ abdominal pain x 1 day. She arrived to the ED with a friend, who endorses he is a surgeon and prescribed her Levaquin and flagyl yesterday. She states she has had fever up to 102.3F. She endorses associated nausea, vomiting, headache, general malaise, and recent travel to [place]. She denies chills, diarrhea, constipation, chest pain, SOB, neck pain, dizziness, light headedness, numbness, weakness, dysuria, vaginal discharge. Workup showed a leukocytosis without UTI, or CXR abnormalities. A procalcitionin was elevated. A CT of the abdomen showed no acute findings. She refused LP to evaluate for meningitis. She was admitted to inpatient status.     Hospital Course:   After admission she was continued on broad spectrum antibiotics; she had no further fevers. Upon receiving vancomycin she developed erythema, itching, and tingling sensation of her lips; initial infusion was slowed but symptoms persisted. Upon re-challenge symptoms returned and vancomycin was added as a potential allergy to her allergy list. She was converted to linezolid in its stead. Infectious diseases was consulted; given her improvement, she was converted to doxycycline and metronidazole to continue antibiotic therapy to complete a 7 day course. She will establish care with a PCP and follow up in the near future.    Consults:   Consults (From admission, onward)        Status Ordering Provider     Inpatient consult to Infectious  Diseases  Once     Provider:  Juan Manuel Mcgraw MD    Completed BRODY GARG          No new Assessment & Plan notes have been filed under this hospital service since the last note was generated.  Service: Hospital Medicine    Final Active Diagnoses:    Diagnosis Date Noted POA    PRINCIPAL PROBLEM:  Sepsis [A41.9] 05/15/2019 Yes      Problems Resolved During this Admission:       Discharged Condition: good    Disposition: Home or Self Care    Follow Up:  Follow-up Information     [facility] In 1 week.    Why:  post-hospital follow-up  Contact information:  [address] [place] [id number]  [phone]                 Patient Instructions:      Notify your health care provider if you experience any of the following:  temperature >100.4     Notify your health care provider if you experience any of the following:  severe uncontrolled pain     Notify your health care provider if you experience any of the following:  persistent nausea and vomiting or diarrhea     Activity as tolerated       Significant Diagnostic Studies:   CBC:  Recent Labs   Lab 05/16/19  0526 05/17/19  0504   WBC 7.32 5.51   HGB 11.2* 11.8*   HCT 34.0* 35.6*    254   GRAN 86.6*  6.3 54.0  3.0   LYMPH 10.4*  0.8* 30.5  1.7   MONO 2.6*  0.2* 8.0  0.4   EOS 0.0 0.4   BASO 0.00 0.01      CMP:  Recent Labs   Lab 05/16/19  0526 05/17/19  0504    138   K 3.3* 4.0    109   CO2 21* 23   BUN 6 5*   CREATININE 0.7 0.7    107   CALCIUM 7.9* 8.9   ANIONGAP 6* 6*          Pending Diagnostic Studies:     None         Medications:  Reconciled Home Medications:      Medication List      START taking these medications    doxycycline 100 MG tablet  Commonly known as:  VIBRA-TABS  Take 1 tablet (100 mg total) by mouth 2 (two) times daily. for 7 days     metroNIDAZOLE 500 MG tablet  Commonly known as:  FLAGYL  Take 1 tablet (500 mg total) by mouth every 8 (eight) hours. for 7 days        CONTINUE taking  these medications    etonogestrel-ethinyl estradiol 0.12-0.015 mg/24 hr vaginal ring  Commonly known as:  NUVARING  Place 1 each vaginally every 21 days.            Indwelling Lines/Drains at time of discharge:   Lines/Drains/Airways          None          Time spent on the discharge of patient: 35 minutes  Patient was seen and examined on the date of discharge and determined to be suitable for discharge.         NIKKI Hu MD  Department of Hospital Medicine  [facility]

## 2019-05-20 LAB
BACTERIA BLD CULT: NORMAL
BACTERIA BLD CULT: NORMAL
WEST NILE VIRUS INTERPRETATION: NORMAL
WNV IGG SER QL IA: NEGATIVE
WNV IGM SER QL IA: NEGATIVE

## 2019-06-10 ENCOUNTER — PATIENT MESSAGE (OUTPATIENT)
Dept: OBSTETRICS AND GYNECOLOGY | Facility: CLINIC | Age: 34
End: 2019-06-10

## 2019-06-25 ENCOUNTER — PROCEDURE VISIT (OUTPATIENT)
Dept: OBSTETRICS AND GYNECOLOGY | Facility: CLINIC | Age: 34
End: 2019-06-25
Payer: MEDICAID

## 2019-06-25 VITALS
WEIGHT: 180.31 LBS | DIASTOLIC BLOOD PRESSURE: 78 MMHG | BODY MASS INDEX: 30.04 KG/M2 | HEIGHT: 65 IN | SYSTOLIC BLOOD PRESSURE: 120 MMHG

## 2019-06-25 DIAGNOSIS — N90.89 VULVAR LESION: Primary | ICD-10-CM

## 2019-06-25 PROCEDURE — 56605 BIOPSY OF VULVA/PERINEUM: CPT | Mod: PBBFAC | Performed by: OBSTETRICS & GYNECOLOGY

## 2019-06-25 PROCEDURE — 56605 PR BIOPSY VULVA/PERINEUM,ONE LESN: ICD-10-PCS | Mod: S$PBB,,, | Performed by: OBSTETRICS & GYNECOLOGY

## 2019-06-25 PROCEDURE — 88305 TISSUE SPECIMEN TO PATHOLOGY, OBSTETRICS/GYNECOLOGY: ICD-10-PCS | Mod: 26,,, | Performed by: PATHOLOGY

## 2019-06-25 PROCEDURE — 88305 TISSUE EXAM BY PATHOLOGIST: CPT | Performed by: PATHOLOGY

## 2019-06-25 PROCEDURE — 56605 BIOPSY OF VULVA/PERINEUM: CPT | Mod: S$PBB,,, | Performed by: OBSTETRICS & GYNECOLOGY

## 2019-06-25 PROCEDURE — 88305 TISSUE EXAM BY PATHOLOGIST: CPT | Mod: 26,,, | Performed by: PATHOLOGY

## 2019-06-25 NOTE — PROCEDURES
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  Brisk bleeding noted after 2mm punch biopsy was done.   Pressure was held for appx 2 minutes and 3-4 silver nitrate used. Hemostasis  Confirmed.

## 2019-06-28 ENCOUNTER — TELEPHONE (OUTPATIENT)
Dept: OBSTETRICS AND GYNECOLOGY | Facility: CLINIC | Age: 34
End: 2019-06-28

## 2019-06-28 NOTE — TELEPHONE ENCOUNTER
----- Message from [facility] Arguelles Mai, MD sent at 6/28/2019  1:01 PM CDT -----  Pathology comes back non-specific.  Likely because of very small tissue sample  However it is likely cherry angioma (small blood vessel) as discussed  Pt may see dermatology for further recommendation.

## 2019-06-28 NOTE — PROGRESS NOTES
Pathology comes back non-specific.  Likely because of very small tissue sample  However it is likely cherry angioma (small blood vessel) as discussed  Pt may see dermatology for further recommendation.

## 2019-07-15 ENCOUNTER — PATIENT MESSAGE (OUTPATIENT)
Dept: OBSTETRICS AND GYNECOLOGY | Facility: CLINIC | Age: 34
End: 2019-07-15

## 2020-08-13 ENCOUNTER — OFFICE VISIT (OUTPATIENT)
Dept: OTOLARYNGOLOGY | Facility: CLINIC | Age: 35
End: 2020-08-13
Payer: MEDICAID

## 2020-08-13 DIAGNOSIS — K13.70 ORAL MUCOSAL LESION: ICD-10-CM

## 2020-08-13 DIAGNOSIS — K11.5 SIALOLITHIASIS: Primary | ICD-10-CM

## 2020-08-13 PROCEDURE — 99213 OFFICE O/P EST LOW 20 MIN: CPT | Mod: PBBFAC | Performed by: NURSE PRACTITIONER

## 2020-08-13 PROCEDURE — 99202 PR OFFICE/OUTPT VISIT, NEW, LEVL II, 15-29 MIN: ICD-10-PCS | Mod: S$PBB,,, | Performed by: NURSE PRACTITIONER

## 2020-08-13 PROCEDURE — 99999 PR PBB SHADOW E&M-EST. PATIENT-LVL III: ICD-10-PCS | Mod: PBBFAC,,, | Performed by: NURSE PRACTITIONER

## 2020-08-13 PROCEDURE — 99202 OFFICE O/P NEW SF 15 MIN: CPT | Mod: S$PBB,,, | Performed by: NURSE PRACTITIONER

## 2020-08-13 PROCEDURE — 99999 PR PBB SHADOW E&M-EST. PATIENT-LVL III: CPT | Mod: PBBFAC,,, | Performed by: NURSE PRACTITIONER

## 2020-08-13 NOTE — PATIENT INSTRUCTIONS
Treatment for Parotid Duct Obstruction    Parotid duct obstruction is when part of your parotid duct becomes blocked. The parotid duct is a small tube that leads from a gland that makes saliva. The duct sends the saliva into your mouth. When its blocked, saliva cant flow normally.  Types of treatment  You may start with treatments such as:  · Drinking more water  · Applying moist heat to the area  · Massaging the gland and duct  · Sucking on candies to create saliva secretion  · Using pain medicines  · Stopping use of any medicines that lower the amount of saliva you make, if possible  Many symptoms go away quickly with these types of treatments. If your symptoms dont get better, you may need treatments such as:  · Lithotripsy, which uses shock waves to break up the stone  · Wire basket retrieval, which removes the stone through the duct  · Sialoendoscopy, which also removes the stone through the duct  · Open surgery, which may include removal of the parotid duct, if these other methods dont work  Your parotid gland should work as normal after the blockage is removed.  Possible complications of parotid duct obstruction  Sometimes obstruction of the duct also leads to infection of the gland and duct. This is more common in older adults. If you have an infection, you may have a fever and pain that gets worse. You may need treatment with antibiotics.  Most of the time, this kind of infection soon goes away with antibiotics. In other cases a more severe infection may happen. You may have an infection of the deep layers of the skin. This can lead to an abscess in your gland or neck. If your symptoms dont get better, you may need to see an ear, nose, and throat doctor.  When to call your healthcare provider  Call your healthcare provider right away if you have any of these:  · Fever of 100.4°F (38.0°C) or higher  · Pain that gets worse  · Pain in new areas of your head or neck   Date Last Reviewed: 8/10/2015  ©  1102-3979 [employer]. [address] [place] [address], [place] [address]. All rights reserved. This information is not intended as a substitute for professional medical care. Always follow your healthcare professional's instructions.

## 2020-08-13 NOTE — PROGRESS NOTES
Subjective:      Marla Rothman is a 35 y.o. female who was self-referred for right facial swelling..    Ms. Rothman reports right facial swelling that began 5 days ago and resolved yesterday. She states while having the facial swelling, she had associated sensation of food stuck in her cheek. She reports having more tenderness with eating. She state similar symptoms occur once or twice a year. She denies any fever, chills, sore throat, difficulty with swallow, or change in speech. She denies night sweats or weight loss.    Past Medical History  She has a past medical history of Abnormal Pap smear of cervix and Endometriosis in scar.    Past Surgical History  She has a past surgical history that includes Pelvic laparoscopy (2013); Excision scar; and Excision of umbilical endometriosis (2014).    Family History  Her family history is not on file.    Social History  She reports that she has been smoking cigarettes. She has never used smokeless tobacco. She reports current alcohol use. She reports that she does not use drugs.    Allergies  She is allergic to vancomycin analogues; latex, natural rubber; and pepto-bismol [bismuth subsalicylate].    Medications  She has a current medication list which includes the following prescription(s): etonogestrel-ethinyl estradiol.    Review of Systems   Constitutional: Negative for chills, fatigue and fever.   HENT: Positive for facial swelling. Negative for congestion, nosebleeds, postnasal drip, rhinorrhea, sinus pressure, sinus pain, sneezing, sore throat, tinnitus, trouble swallowing and voice change.    Eyes: Negative for photophobia, redness, itching and visual disturbance.   Respiratory: Negative for apnea, cough, shortness of breath, wheezing and stridor.    Cardiovascular: Negative for chest pain and palpitations.   Gastrointestinal: Negative for diarrhea, nausea and vomiting.   Endocrine: Negative.    Genitourinary: Negative for decreased urine volume, dysuria and  frequency.   Musculoskeletal: Negative for arthralgias, myalgias and neck stiffness.   Skin: Negative for rash and wound.   Allergic/Immunologic: Negative for environmental allergies, food allergies and immunocompromised state.   Neurological: Negative for dizziness, syncope, weakness, light-headedness and headaches.   Hematological: Negative for adenopathy. Does not bruise/bleed easily.   Psychiatric/Behavioral: Negative for confusion, decreased concentration and sleep disturbance.          Objective:     There were no vitals taken for this visit.     Constitutional:   She is oriented to person, place, and time. Vital signs are normal. She appears well-developed and well-nourished. She appears alert. Normal speech.      Head:  Normocephalic and atraumatic. Salivary glands normal.  Facial strength is normal.    No palpable salivary stone noted on exam. No parotid swelling today on exam.      Ears:    Right Ear: No lacerations. No drainage, swelling or tenderness. No foreign bodies. No mastoid tenderness. Tympanic membrane is not injected, not scarred, not perforated, not erythematous, not retracted and not bulging. Tympanic membrane mobility is normal. No middle ear effusion. No hemotympanum.   Left Ear: No lacerations. No drainage, swelling or tenderness. No foreign bodies. No mastoid tenderness. Tympanic membrane is not injected, not scarred, not perforated, not erythematous, not retracted and not bulging. Tympanic membrane mobility is normal.  No middle ear effusion. No hemotympanum.     Nose:  No mucosal edema, rhinorrhea, nose lacerations, sinus tenderness, septal deviation, nasal septal hematoma or polyps. No epistaxis.  No foreign bodies. No turbinate hypertrophy.  Right sinus exhibits no maxillary sinus tenderness and no frontal sinus tenderness. Left sinus exhibits no maxillary sinus tenderness and no frontal sinus tenderness.     Mouth/Throat  Oropharynx clear and moist without lesions or asymmetry, normal  uvula midline and lips, teeth, and gums normal. No uvula swelling, oral lesions, trismus, mucous membrane lesions or xerostomia. No oropharyngeal exudate, posterior oropharyngeal edema or posterior oropharyngeal erythema.         Neck:  Neck normal without thyromegaly masses, asymmetry, normal tracheal structure, crepitus, and tenderness, thyroid normal, trachea normal and no adenopathy.     Psychiatric:   She has a normal mood and affect. Her speech is normal and behavior is normal.     Neurological:   She is alert and oriented to person, place, and time. No cranial nerve deficit.     Skin:   No abrasions, lacerations, lesions, or rashes.       Procedure    None        Data Reviewed    WBC (K/uL)   Date Value   05/17/2019 5.51     Eosinophil% (%)   Date Value   05/17/2019 7.1     Eos # (K/uL)   Date Value   05/17/2019 0.4     Platelets (K/uL)   Date Value   05/17/2019 254     Glucose (mg/dL)   Date Value   05/17/2019 107           Assessment:     1. Sialolithiasis    2. Oral mucosal lesion         Plan:        I had a long discussion with the patient regarding her condition and the further workup and management options.      Patient has a pedunculated lesion at the border of the superior portion of the right tonsil and oropharynx. I placed referral to Dr. Kelsey for evaluation.    Patients right facial swelling which has resolved spontaneously is most consistent with parotid salivary stone, which has resolved.  I recommend patient to suck on lemon drops if symptoms return. If symptoms worsen, or fail to improve follow up with me.      Treatment for Parotid Duct Obstruction    Parotid duct obstruction is when part of your parotid duct becomes blocked. The parotid duct is a small tube that leads from a gland that makes saliva. The duct sends the saliva into your mouth. When its blocked, saliva cant flow normally.  Types of treatment  You may start with treatments such as:  · Drinking more water  · Applying moist  heat to the area  · Massaging the gland and duct  · Sucking on candies to create saliva secretion  · Using pain medicines  · Stopping use of any medicines that lower the amount of saliva you make, if possible  Many symptoms go away quickly with these types of treatments. If your symptoms dont get better, you may need treatments such as:  · Lithotripsy, which uses shock waves to break up the stone  · Wire basket retrieval, which removes the stone through the duct  · Sialoendoscopy, which also removes the stone through the duct  · Open surgery, which may include removal of the parotid duct, if these other methods dont work  Your parotid gland should work as normal after the blockage is removed.  Possible complications of parotid duct obstruction  Sometimes obstruction of the duct also leads to infection of the gland and duct. This is more common in older adults. If you have an infection, you may have a fever and pain that gets worse. You may need treatment with antibiotics.  Most of the time, this kind of infection soon goes away with antibiotics. In other cases a more severe infection may happen. You may have an infection of the deep layers of the skin. This can lead to an abscess in your gland or neck. If your symptoms dont get better, you may need to see an ear, nose, and throat doctor.  When to call your healthcare provider  Call your healthcare provider right away if you have any of these:  · Fever of 100.4°F (38.0°C) or higher  · Pain that gets worse  · Pain in new areas of your head or neck   Date Last Reviewed: 8/10/2015  © 2121-7023 [employer]. [address] [place] [address], [place] [address]. All rights reserved. This information is not intended as a substitute for professional medical care. Always follow your healthcare professional's instructions.

## 2020-09-09 ENCOUNTER — OFFICE VISIT (OUTPATIENT)
Dept: OTOLARYNGOLOGY | Facility: CLINIC | Age: 35
End: 2020-09-09
Payer: MEDICAID

## 2020-09-09 VITALS
WEIGHT: 194 LBS | HEART RATE: 72 BPM | BODY MASS INDEX: 32.28 KG/M2 | DIASTOLIC BLOOD PRESSURE: 66 MMHG | SYSTOLIC BLOOD PRESSURE: 103 MMHG

## 2020-09-09 DIAGNOSIS — D36.9 PAPILLOMA: Primary | ICD-10-CM

## 2020-09-09 DIAGNOSIS — K13.70 ORAL MUCOSAL LESION: ICD-10-CM

## 2020-09-09 PROCEDURE — 88305 TISSUE EXAM BY PATHOLOGIST: CPT | Performed by: PATHOLOGY

## 2020-09-09 PROCEDURE — 42808 PR EXCISE PHARYNX LESION: ICD-10-PCS | Mod: S$PBB,,, | Performed by: OTOLARYNGOLOGY

## 2020-09-09 PROCEDURE — 99213 OFFICE O/P EST LOW 20 MIN: CPT | Mod: 25,S$PBB,, | Performed by: OTOLARYNGOLOGY

## 2020-09-09 PROCEDURE — 99213 PR OFFICE/OUTPT VISIT, EST, LEVL III, 20-29 MIN: ICD-10-PCS | Mod: 25,S$PBB,, | Performed by: OTOLARYNGOLOGY

## 2020-09-09 PROCEDURE — 99999 PR PBB SHADOW E&M-EST. PATIENT-LVL III: CPT | Mod: PBBFAC,,, | Performed by: OTOLARYNGOLOGY

## 2020-09-09 PROCEDURE — 99213 OFFICE O/P EST LOW 20 MIN: CPT | Mod: PBBFAC | Performed by: OTOLARYNGOLOGY

## 2020-09-09 PROCEDURE — 88305 TISSUE EXAM BY PATHOLOGIST: CPT | Mod: 26,,, | Performed by: PATHOLOGY

## 2020-09-09 PROCEDURE — 99999 PR PBB SHADOW E&M-EST. PATIENT-LVL III: ICD-10-PCS | Mod: PBBFAC,,, | Performed by: OTOLARYNGOLOGY

## 2020-09-09 PROCEDURE — 42800 BIOPSY OF THROAT: CPT | Mod: PBBFAC | Performed by: OTOLARYNGOLOGY

## 2020-09-09 PROCEDURE — 88305 TISSUE EXAM BY PATHOLOGIST: ICD-10-PCS | Mod: 26,,, | Performed by: PATHOLOGY

## 2020-09-09 PROCEDURE — 42808 EXCISE PHARYNX LESION: CPT | Mod: S$PBB,,, | Performed by: OTOLARYNGOLOGY

## 2020-09-09 NOTE — PROGRESS NOTES
"Head and Neck Surgery Consult    Seen in consultation from Robina Gonzalez NP    HPI: Marla Rothman is a 35 y.o. female presenting with a lesion noted incidentally at the superior pole of her R tonsil. She has been feeling something in that area she described as a "skin tag" for 2 months. She was seen by Robina for sialadenitis, which has resolved uneventfully. This lesion has been asymptomatic and has not been treated with anything.     Past Medical History:   Diagnosis Date    Abnormal Pap smear of cervix 2017    Endometriosis in scar 2013    umbilicus       Past Surgical History:   Procedure Laterality Date    Excision of umbilical endometriosis  2014    Excision scar      [facility]ELVIC [facility]AROSCOPY  2013    In [facility]         Current Outpatient Medications:     etonogestrel-ethinyl estradiol (NUVARING) 0.12-0.015 mg/24 hr vaginal ring, Place 1 each vaginally every 21 days., Disp: 3 each, Rfl: 4    Review of patient's allergies indicates:   Allergen Reactions    Vancomycin analogues Hives and Swelling    Latex, natural rubber     Pepto-bismol [bismuth subsalicylate]      Facial swelling         Family History   Problem Relation Age of Onset    Breast cancer Neg Hx     Colon cancer Neg Hx     Ovarian cancer Neg Hx        Social History     Socioeconomic History    Marital status:      Spouse name: Not on file    Number of children: Not on file    Years of education: Not on file    Highest education level: Not on file   Occupational History    Not on file   Social Needs    Financial resource strain: Not on file    Food insecurity     Worry: Not on file     Inability: Not on file    Transportation needs     Medical: Not on file     Non-medical: Not on file   Tobacco Use    Smoking status: Current Every Day Smoker     Types: Cigarettes    Smokeless tobacco: Never Used   Substance and Sexual Activity    Alcohol use: Yes     Comment: 1 time a week     Drug use: No    Sexual activity: Not " Currently     Partners: Male     Birth control/protection: None   Lifestyle    Physical activity     Days per week: Not on file     Minutes per session: Not on file    Stress: Not on file   Relationships    Social connections     Talks on phone: Not on file     Gets together: Not on file     Attends Taoism service: Not on file     Active member of club or organization: Not on file     Attends meetings of clubs or organizations: Not on file     Relationship status: Not on file   Other Topics Concern    Not on file   Social History Narrative    Not on file       Review of Systems -  Constitutional: Denies having night sweats, constant fatigue, loss of appetite or recent substantial weight loss.  Eyes: Denies blurred vision or double vision.  Respiratory: Denies symptoms of shortness of breath, noisy breathing, hoarseness or chronic cough.  GI: Denies symptoms of heartburn, acid regurgitation, or the known presence of a hiatal hernia.  The remainder of a 10-point review of systems is negative    REVIEW OF RADIOLOGICAL FILMS AND RECORDS (PERSONALLY REVIEWED):  Noncontributory    REVIEW OF LABS (PERSONALLY REVIEWED)  Noncontributory    PHYSICAL EXAM:  Vitals - /66 (Patient Position: Sitting)   Pulse 72   Wt 88 kg (194 lb 0.1 oz)   BMI 32.28 kg/m²   Constitutional -      General Appearance: well developed, well nourished, without obvious deformities     Communication: speaks with a normal voice without hoarseness  Head & Face -     Overall: no obvious scars, lesions or masses     Parotid and submandibular glands: no masses or tenderness     Facial strength: normal and equal bilaterally  Eyes -      EOM intact  Ear, Nose, Mouth & Throat -     Ears: both left and right external auditory canals and TM's are normal, no external deformities     Nasal exam: mucosa is pink, septum is midline, visible turbinates are normal on anterior rhinoscopy     Mastication: teeth appear in good repair     Oral Cavity and  oropharynx: mucosa, hard and soft palates, tongue, posterior pharyngeal wall, lips and gums are without lesions. Tonsils appear 2+, there is a 5mm pedunculated papilloma at the superior pole on the R.  Respiratory:     Breathing unlabored, no stridor  Cardiovascular:     No JVD, capillary refill normal  Larynx: using the mirror for indirect laryngoscopy, the epiglottic, false cords, true cords, and pyriform sinuses are without lesions and the true vocal cords move normally  Neck: appears symmetric, and on palpation is without masses   Endocrine:     Thyroid: no asymmetry, thyromegaly, or thyroid nodules on palpation  Lymphatic:     No cervical lymphadenopathy  Cranial Nerves:      II: Pupillary reflexes normal     III, IV, VI: EOM normal     V: 1,2,3: normal sensation     VII: Normal strength in all divisions     IX, X: Normal voice, palatal elevation and sensation     XI: Shoulder strength normal       XII: Tongue mobility normal  Psychiatric:     Appropriate affect  Procedure: Excision of papiloma of R tonsil    Indications: tonsil papilloma    Anesthesia: Local anesthesia, 1% lidocaine w/1:100,000 epinephrine    Informed Consent: Obtained    Time Out: Performed    Procedure in Detail: A Gigi forceps was used to grasp the papilloma at the base of the stalk and a Mark scissors was used to excise it. Topical silver nitrate was used for hemostasis.The patient tolerated the procedure well.    Complications: None    EBL: 1mL    ASSESSMENT: papilloma    PLAN: Avoid spicy and acidic foods for 2-3 days. I will call her with pathology results, usually in ~1 week.       Johnathan Acosta

## 2020-09-09 NOTE — LETTER
September 9, 2020      Robina Gonzalez, QUINTIN  [address]  [facility] [address]           [facility]  [address]  [facility] [address]  Phone: [phone]  Fax: [phone]          Patient: Marla Rothman   MR Number: [id number]   YOB: 1985   Date of Visit: 9/9/2020       Dear oRbina Gonzalez:    Thank you for referring Marla Rothman to me for evaluation. Attached you will find relevant portions of my assessment and plan of care.    If you have questions, please do not hesitate to call me. I look forward to following Marla Rothman along with you.    Sincerely,    Johnathan Acosta MD    Enclosure  CC:  No Recipients    If you would like to receive this communication electronically, please contact [email or web address] or [phone] to request more information on [employer] Link access.    For providers and/or their staff who would like to refer a patient to [facility], please contact us through our one-stop-shop provider referral line, [facility], at [phone].    If you feel you have received this communication in error or would no longer like to receive these types of communications, please e-mail [email or web address]

## 2020-09-22 ENCOUNTER — TELEPHONE (OUTPATIENT)
Dept: OTOLARYNGOLOGY | Facility: CLINIC | Age: 35
End: 2020-09-22

## 2020-09-22 LAB
FINAL PATHOLOGIC DIAGNOSIS: NORMAL
GROSS: NORMAL

## 2020-09-28 ENCOUNTER — PATIENT MESSAGE (OUTPATIENT)
Dept: OBSTETRICS AND GYNECOLOGY | Facility: CLINIC | Age: 35
End: 2020-09-28

## 2020-09-28 RX ORDER — FLUCONAZOLE 150 MG/1
150 TABLET ORAL ONCE
Qty: 1 TABLET | Refills: 0 | Status: SHIPPED | OUTPATIENT
Start: 2020-09-28 | End: 2020-09-28

## 2020-09-29 ENCOUNTER — PATIENT MESSAGE (OUTPATIENT)
Dept: OBSTETRICS AND GYNECOLOGY | Facility: CLINIC | Age: 35
End: 2020-09-29

## 2020-12-09 ENCOUNTER — OFFICE VISIT (OUTPATIENT)
Dept: PRIMARY CARE CLINIC | Facility: CLINIC | Age: 35
End: 2020-12-09
Payer: MEDICAID

## 2020-12-09 ENCOUNTER — LAB VISIT (OUTPATIENT)
Dept: PRIMARY CARE CLINIC | Facility: CLINIC | Age: 35
End: 2020-12-09
Payer: MEDICAID

## 2020-12-09 VITALS
BODY MASS INDEX: 30.99 KG/M2 | WEIGHT: 197.44 LBS | OXYGEN SATURATION: 99 % | HEIGHT: 67 IN | TEMPERATURE: 99 F | SYSTOLIC BLOOD PRESSURE: 126 MMHG | DIASTOLIC BLOOD PRESSURE: 82 MMHG | HEART RATE: 96 BPM

## 2020-12-09 DIAGNOSIS — Z13.220 ENCOUNTER FOR LIPID SCREENING FOR CARDIOVASCULAR DISEASE: ICD-10-CM

## 2020-12-09 DIAGNOSIS — R10.11 RUQ PAIN: Primary | ICD-10-CM

## 2020-12-09 DIAGNOSIS — Z72.0 TOBACCO USE: ICD-10-CM

## 2020-12-09 DIAGNOSIS — J45.20 MILD INTERMITTENT ASTHMA, UNSPECIFIED WHETHER COMPLICATED: ICD-10-CM

## 2020-12-09 DIAGNOSIS — Z13.6 ENCOUNTER FOR LIPID SCREENING FOR CARDIOVASCULAR DISEASE: ICD-10-CM

## 2020-12-09 DIAGNOSIS — R53.83 FATIGUE, UNSPECIFIED TYPE: ICD-10-CM

## 2020-12-09 DIAGNOSIS — Z86.59 HISTORY OF ADHD: ICD-10-CM

## 2020-12-09 DIAGNOSIS — Z13.1 SCREENING FOR DIABETES MELLITUS: ICD-10-CM

## 2020-12-09 DIAGNOSIS — Z86.19 HISTORY OF HPV INFECTION: ICD-10-CM

## 2020-12-09 DIAGNOSIS — J30.9 ALLERGIC RHINITIS, UNSPECIFIED SEASONALITY, UNSPECIFIED TRIGGER: ICD-10-CM

## 2020-12-09 PROBLEM — G50.1 ATYPICAL FACIAL PAIN: Status: ACTIVE | Noted: 2020-08-12

## 2020-12-09 PROBLEM — T78.40XA ALLERGY, UNSPECIFIED, INITIAL ENCOUNTER: Status: ACTIVE | Noted: 2020-08-12

## 2020-12-09 PROBLEM — R59.9 ENLARGED LYMPH NODES, UNSPECIFIED: Status: ACTIVE | Noted: 2020-10-24

## 2020-12-09 LAB
ESTIMATED AVG GLUCOSE: 105 MG/DL (ref 68–131)
HBA1C MFR BLD HPLC: 5.3 % (ref 4–5.6)

## 2020-12-09 PROCEDURE — 99999 PR PBB SHADOW E&M-EST. PATIENT-LVL IV: CPT | Mod: PBBFAC,,, | Performed by: FAMILY MEDICINE

## 2020-12-09 PROCEDURE — 80061 LIPID PANEL: CPT

## 2020-12-09 PROCEDURE — 36415 COLL VENOUS BLD VENIPUNCTURE: CPT | Mod: PBBFAC,PN

## 2020-12-09 PROCEDURE — 83036 HEMOGLOBIN GLYCOSYLATED A1C: CPT

## 2020-12-09 PROCEDURE — 99204 OFFICE O/P NEW MOD 45 MIN: CPT | Mod: S$PBB,,, | Performed by: FAMILY MEDICINE

## 2020-12-09 PROCEDURE — 99204 PR OFFICE/OUTPT VISIT, NEW, LEVL IV, 45-59 MIN: ICD-10-PCS | Mod: S$PBB,,, | Performed by: FAMILY MEDICINE

## 2020-12-09 PROCEDURE — 84443 ASSAY THYROID STIM HORMONE: CPT

## 2020-12-09 PROCEDURE — 99999 PR PBB SHADOW E&M-EST. PATIENT-LVL IV: ICD-10-PCS | Mod: PBBFAC,,, | Performed by: FAMILY MEDICINE

## 2020-12-09 PROCEDURE — 99214 OFFICE O/P EST MOD 30 MIN: CPT | Mod: PBBFAC,PN | Performed by: FAMILY MEDICINE

## 2020-12-09 RX ORDER — CEPHALEXIN 500 MG/1
CAPSULE ORAL
COMMUNITY
Start: 2020-10-24 | End: 2020-12-09

## 2020-12-09 RX ORDER — FLUCONAZOLE 150 MG/1
TABLET ORAL
COMMUNITY
Start: 2020-09-28 | End: 2020-12-09

## 2020-12-09 NOTE — PROGRESS NOTES
Clinic Note  12/9/2020      Subjective:       Patient ID:  Marla is a 35 y.o. female being seen for an new visit.      Chief Complaint: Establish Care and Medication Refill    Right upper quadrant pain-present for over year.  Initially had pain with certain food triggers including alcohol.  Patient has been changing diet and has gotten better, but still having some pain.  Some sharp pain with movement.  Also has left upper quadrant pain that is worse with movement.  Denies nausea, vomiting, diarrhea, constipation.    History of ADHD-patient was given Adderall while in [place] since childhood.  Currently works as a  cooking [employer]    Fatigue-has chronic fatigue and would like to get thyroid checked    Allergies and asthma-was diagnosed with this as a kid.  Since living in new home, overall sinus issues and breathing has been worse.  Does not take any meds for this.    Smoke - not ready to quit    History of HPV-history of high risk HPV and LGSIL in 2017.  In  April 2019 had normal Pap and negative HPV with plan to repeat 1 year    Social-patient moved here from [place] 8 years ago    Family History   Problem Relation Age of Onset    Breast cancer Neg Hx     Colon cancer Neg Hx     Ovarian cancer Neg Hx      Social History     Socioeconomic History    Marital status:      Spouse name: Not on file    Number of children: Not on file    Years of education: Not on file    Highest education level: Not on file   Occupational History    Not on file   Social Needs    Financial resource strain: Not on file    Food insecurity     Worry: Not on file     Inability: Not on file    Transportation needs     Medical: Not on file     Non-medical: Not on file   Tobacco Use    Smoking status: Current Every Day Smoker     Types: Cigarettes    Smokeless tobacco: Never Used   Substance and Sexual Activity    Alcohol use: Yes     Comment: 1 time a week     Drug use: No    Sexual activity: Not Currently      Partners: Male     Birth control/protection: None   Lifestyle    Physical activity     Days per week: Not on file     Minutes per session: Not on file    Stress: Not on file   Relationships    Social connections     Talks on phone: Not on file     Gets together: Not on file     Attends Cheondo[employer] service: Not on file     Active member of club or organization: Not on file     Attends meetings of clubs or organizations: Not on file     Relationship status: Not on file   Other Topics Concern    Not on file   Social History Narrative    Not on file     Past Surgical History:   Procedure Laterality Date    Excision of umbilical endometriosis  2014    Excision scar      PELVIC LAPAROSCOPY  2013    [facility]     Medication List with Changes/Refills   Discontinued Medications    CEPHALEXIN (KEFLEX) 500 MG CAPSULE    TK 1 C PO Q 6 H FOR 7 DAYS    ETONOGESTREL-ETHINYL ESTRADIOL (NUVARING) 0.12-0.015 MG/24 HR VAGINAL RING    Place 1 each vaginally every 21 days.    FLUCONAZOLE (DIFLUCAN) 150 MG TAB         Patient Active Problem List   Diagnosis    Endometriosis in cutaneous scar    History of abnormal cervical Pap smear    Sepsis    Allergy, unspecified, initial encounter    Atypical facial pain    Enlarged lymph nodes, unspecified    History of ADHD    Fatigue    Mild intermittent asthma    History of HPV infection    Tobacco use     Review of Systems   Constitutional: Positive for malaise/fatigue. Negative for chills, fever and weight loss.   Respiratory: Negative for cough, shortness of breath and wheezing.    Cardiovascular: Negative for chest pain and palpitations.   Gastrointestinal: Positive for abdominal pain. Negative for constipation, diarrhea, nausea and vomiting.   Genitourinary: Negative for dysuria.   Musculoskeletal: Negative for myalgias.   Skin: Negative for rash.         Objective:      /82 (BP Location: Left arm, Patient Position: Sitting, BP Method: Large (Manual))   Pulse 96    "Temp 98.5 °F (36.9 °C) (Oral)   Ht 5' 7" (1.702 m)   Wt 89.5 kg (197 lb 6.8 oz)   SpO2 99%   BMI 30.92 kg/m²   Estimated body mass index is 30.92 kg/m² as calculated from the following:    Height as of this encounter: 5' 7" (1.702 m).    Weight as of this encounter: 89.5 kg (197 lb 6.8 oz).  Physical Exam   Constitutional: She is oriented to person, place, and time and well-developed, well-nourished, and in no distress. No distress.   HENT:   Head: Normocephalic and atraumatic.   Eyes: Conjunctivae and EOM are normal.   Cardiovascular: Normal rate, regular rhythm and normal heart sounds.   Pulmonary/Chest: Effort normal and breath sounds normal. No respiratory distress. She has no wheezes.   Abdominal: Soft. Bowel sounds are normal. She exhibits no distension. There is no abdominal tenderness. There is no rebound and no guarding.   Musculoskeletal: Normal range of motion.   Neurological: She is alert and oriented to person, place, and time. GCS score is 15.   Skin: Skin is warm and dry. No rash noted. She is not diaphoretic. No erythema.   Psychiatric:   Blunted affect         Assessment and Plan:     1. RUQ pain  - will evaluate for gallbladder etiology  - US Abdomen Limited; Future    2. Encounter for lipid screening for cardiovascular disease  - Lipid Panel; Future    3. Screening for diabetes mellitus  - Hemoglobin A1C; Future    4. History of ADHD  - previously on Adderall, will refer patient to psychiatry for evaluation of adult ADHD  - Ambulatory referral/consult to Psychiatry; Future    5. Fatigue, unspecified type  - TSH; Future    6. Allergic rhinitis, unspecified seasonality, unspecified trigger  - patient declined taking medicines for allergies.    7. Mild intermittent asthma, unspecified whether complicated  - patient declined taking medicines for this    8. History of HPV infection  - had positive HPV and LGSIL in 2017, had normal Pap and HPV in 2019.  Due for another Pap smear and Co-testing this " year.  Patient to schedule at another visit    9. Tobacco use  - in pre contemplative stage        Follow up:   Follow up in about 1 month (around 1/9/2021) for WWE and pap.     Other Orders Placed This Visit:  Orders Placed This Encounter   Procedures    US Abdomen Limited     Standing Status:   Future     Standing Expiration Date:   12/9/2021     Order Specific Question:   Reason for Exam:     Answer:   RUQ pain     Order Specific Question:   May the Radiologist modify the order per protocol to meet the clinical needs of the patient?     Answer:   Yes    Lipid Panel     Standing Status:   Future     Number of Occurrences:   1     Standing Expiration Date:   1/9/2021    Hemoglobin A1C     Standing Status:   Future     Number of Occurrences:   1     Standing Expiration Date:   1/9/2021    TSH     Standing Status:   Future     Number of Occurrences:   1     Standing Expiration Date:   1/9/2021    Ambulatory referral/consult to Psychiatry     Standing Status:   Future     Standing Expiration Date:   1/9/2022     Referral Priority:   Routine     Referral Type:   Psychiatric     Referral Reason:   Specialty Services Required     Requested Specialty:   Psychiatry     Number of Visits Requested:   1           John Padilla MD

## 2020-12-10 LAB
CHOLEST SERPL-MCNC: 170 MG/DL (ref 120–199)
CHOLEST/HDLC SERPL: 2.1 {RATIO} (ref 2–5)
HDLC SERPL-MCNC: 81 MG/DL (ref 40–75)
HDLC SERPL: 47.6 % (ref 20–50)
LDLC SERPL CALC-MCNC: 69.2 MG/DL (ref 63–159)
NONHDLC SERPL-MCNC: 89 MG/DL
TRIGL SERPL-MCNC: 99 MG/DL (ref 30–150)
TSH SERPL DL<=0.005 MIU/L-ACNC: 0.63 UIU/ML (ref 0.4–4)

## 2020-12-11 ENCOUNTER — HOSPITAL ENCOUNTER (OUTPATIENT)
Dept: RADIOLOGY | Facility: OTHER | Age: 35
Discharge: HOME | End: 2020-12-11
Attending: FAMILY MEDICINE
Payer: MEDICAID

## 2020-12-11 DIAGNOSIS — R10.11 RUQ PAIN: ICD-10-CM

## 2020-12-11 PROCEDURE — 76705 US ABDOMEN LIMITED: ICD-10-PCS | Mod: 26,,, | Performed by: RADIOLOGY

## 2020-12-11 PROCEDURE — 76705 ECHO EXAM OF ABDOMEN: CPT | Mod: TC

## 2020-12-11 PROCEDURE — 76705 ECHO EXAM OF ABDOMEN: CPT | Mod: 26,,, | Performed by: RADIOLOGY

## 2020-12-14 ENCOUNTER — OFFICE VISIT (OUTPATIENT)
Dept: PODIATRY | Facility: CLINIC | Age: 35
End: 2020-12-14
Payer: MEDICAID

## 2020-12-14 VITALS
BODY MASS INDEX: 31.13 KG/M2 | WEIGHT: 198.38 LBS | SYSTOLIC BLOOD PRESSURE: 112 MMHG | DIASTOLIC BLOOD PRESSURE: 71 MMHG | HEART RATE: 76 BPM | HEIGHT: 67 IN

## 2020-12-14 DIAGNOSIS — M20.12 HALLUX VALGUS, BILATERAL: ICD-10-CM

## 2020-12-14 DIAGNOSIS — M72.2 PLANTAR FASCIITIS: ICD-10-CM

## 2020-12-14 DIAGNOSIS — M20.11 HALLUX VALGUS, BILATERAL: ICD-10-CM

## 2020-12-14 DIAGNOSIS — M79.671 PAIN IN BOTH FEET: Primary | ICD-10-CM

## 2020-12-14 DIAGNOSIS — M79.672 PAIN IN BOTH FEET: Primary | ICD-10-CM

## 2020-12-14 PROCEDURE — 99214 OFFICE O/P EST MOD 30 MIN: CPT | Mod: PBBFAC,PN | Performed by: PODIATRIST

## 2020-12-14 PROCEDURE — 99999 PR PBB SHADOW E&M-EST. PATIENT-LVL IV: ICD-10-PCS | Mod: PBBFAC,,, | Performed by: PODIATRIST

## 2020-12-14 PROCEDURE — 99203 PR OFFICE/OUTPT VISIT, NEW, LEVL III, 30-44 MIN: ICD-10-PCS | Mod: S$PBB,,, | Performed by: PODIATRIST

## 2020-12-14 PROCEDURE — 99999 PR PBB SHADOW E&M-EST. PATIENT-LVL IV: CPT | Mod: PBBFAC,,, | Performed by: PODIATRIST

## 2020-12-14 PROCEDURE — 99203 OFFICE O/P NEW LOW 30 MIN: CPT | Mod: S$PBB,,, | Performed by: PODIATRIST

## 2020-12-14 NOTE — PROGRESS NOTES
Subjective:      Patient ID: Marla Rothman is a 35 y.o. female.    Chief Complaint: Foot Problem (pain and walking inwards )    Marla is a 35 y.o. female who presents to the podiatry clinic  with complaint of  bilateral foot pain. Onset of the symptoms was >1yr.ago. Precipitating event: increased activity. Current symptoms include: ability to bear weight, but with some pain, pain at the medial aspect of the ankle and worsening symptoms after a period of activity. Aggravating factors: standing. Symptoms have waxed and waned. Patient has had no prior foot problems. Evaluation to date: none. Treatment to date: none. Patients rates pain 6/10 on pain scale by the middle to EOD;  for a living (mediterranean style). Also has back problems. States pain mid/anterior medial arch B/L & medial 1st met.head. Not in supportive shoes as she is home mostly this past year w/ pandemic. Has put on weight.     Objective:      Review of Systems   Constitution: Positive for malaise/fatigue and weight gain.   Cardiovascular: Negative for claudication and leg swelling.   Skin: Negative for color change, dry skin and suspicious lesions.   Musculoskeletal: Positive for back pain. Negative for arthritis, joint pain, joint swelling and muscle weakness.   Neurological: Negative for numbness, paresthesias and weakness.   Psychiatric/Behavioral: The patient is not nervous/anxious.      Physical Exam  Vitals signs reviewed.   Constitutional:       General: She is not in acute distress.     Appearance: Normal appearance. She is well-developed. She is obese.   Cardiovascular:      Pulses: Normal pulses.           Dorsalis pedis pulses are 2+ on the right side and 2+ on the left side.   Musculoskeletal: Normal range of motion.         General: Deformity present. No swelling or tenderness.      Right lower leg: No edema.      Left lower leg: No edema.      Right foot: Normal range of motion and normal capillary refill. Deformity and  bunion present. No tenderness, bony tenderness, swelling or crepitus.      Left foot: Normal range of motion and normal capillary refill. Deformity and bunion present. No tenderness, bony tenderness, swelling or crepitus.   Feet:      Right foot:      Skin integrity: Skin integrity normal. No skin breakdown, erythema, warmth, callus or dry skin.      Left foot:      Skin integrity: Skin integrity normal. No skin breakdown, erythema, warmth, callus or dry skin.   Skin:     General: Skin is warm and dry.      Capillary Refill: Capillary refill takes less than 2 seconds.      Findings: No bruising, erythema or lesion.      Nails: There is no clubbing.     Neurological:      Mental Status: She is alert.      Sensory: Sensation is intact. No sensory deficit.      Motor: Motor function is intact. No weakness or abnormal muscle tone.      Gait: Gait is intact. Gait normal.   Psychiatric:         Mood and Affect: Mood and affect normal. Mood is not anxious.         Speech: Speech normal.         Behavior: Behavior normal. Behavior is not agitated. Behavior is cooperative.          Assessment:      Encounter Diagnoses   Name Primary?    Hallux valgus, bilateral     Pain in both feet Yes    Plantar fasciitis          Plan:      Marla was seen today for foot problem.    Diagnoses and all orders for this visit:    Pain in both feet    Hallux valgus, bilateral    Plantar fasciitis    I counseled the patient on her conditions, their implications and medical management.    Patient instructed on proper shoe gear, never walking without protective shoe gear.  Dispensed a pair of SIlopos met.gelstraps for support of MLA. Advised re: orthotics & will look @ [facility] orthotics. No joint pain 1st MPJ including w/examination, so no specific treatment for HAV provided currently. Reevaluate 1 month.

## 2020-12-17 ENCOUNTER — OFFICE VISIT (OUTPATIENT)
Dept: URGENT CARE | Facility: CLINIC | Age: 35
End: 2020-12-17
Payer: MEDICAID

## 2020-12-17 VITALS
DIASTOLIC BLOOD PRESSURE: 76 MMHG | OXYGEN SATURATION: 99 % | HEART RATE: 86 BPM | TEMPERATURE: 98 F | SYSTOLIC BLOOD PRESSURE: 118 MMHG

## 2020-12-17 DIAGNOSIS — Z20.822 EXPOSURE TO COVID-19 VIRUS: Primary | ICD-10-CM

## 2020-12-17 LAB
CTP QC/QA: YES
SARS-COV-2 RDRP RESP QL NAA+PROBE: NEGATIVE

## 2020-12-17 PROCEDURE — 87635: ICD-10-PCS | Mod: QW,S$GLB,, | Performed by: NURSE PRACTITIONER

## 2020-12-17 PROCEDURE — 99211 OFF/OP EST MAY X REQ PHY/QHP: CPT | Mod: S$GLB,CS,, | Performed by: NURSE PRACTITIONER

## 2020-12-17 PROCEDURE — 87635 SARS-COV-2 COVID-19 AMP PRB: CPT | Mod: QW,S$GLB,, | Performed by: NURSE PRACTITIONER

## 2020-12-17 PROCEDURE — 99211 PR OFFICE/OUTPT VISIT, EST, LEVL I: ICD-10-PCS | Mod: S$GLB,CS,, | Performed by: NURSE PRACTITIONER

## 2020-12-17 NOTE — PROGRESS NOTES
Subjective:       Patient ID: Marla Rothman is a 35 y.o. female.    Vitals:  temperature is 98.4 °F (36.9 °C). Her blood pressure is 118/76 and her pulse is 86. Her oxygen saturation is 99%.     Chief Complaint: COVID-19 Concerns    Pt states that her symptomd started a couple days ago and was exposed     URI   This is a new problem. The current episode started in the past 7 days. The problem has been unchanged. There has been no fever. Associated symptoms include congestion, coughing, nausea and sneezing. Pertinent negatives include no ear pain, rash, sinus pain, sore throat, vomiting or wheezing. She has tried decongestant for the symptoms. The treatment provided mild relief.       Constitution: Negative for chills, sweating, fatigue and fever.   HENT: Positive for congestion. Negative for ear pain, sinus pain, sinus pressure, sore throat and voice change.    Neck: Negative for painful lymph nodes.   Eyes: Negative for eye redness.   Respiratory: Positive for cough and sputum production. Negative for chest tightness, bloody sputum, COPD, shortness of breath, stridor, wheezing and asthma.    Gastrointestinal: Positive for nausea. Negative for vomiting.   Musculoskeletal: Negative for muscle ache.   Skin: Negative for rash.   Allergic/Immunologic: Positive for sneezing. Negative for seasonal allergies and asthma.   Hematologic/Lymphatic: Negative for swollen lymph nodes.       Objective:      Physical Exam   Constitutional: She is oriented to person, place, and time.   HENT:   Head: Normocephalic and atraumatic.   Cardiovascular: Normal rate.   Pulmonary/Chest: Effort normal. No respiratory distress.   Abdominal: Normal appearance.   Neurological: She is alert and oriented to person, place, and time.   Skin: Skin is warm and dry. Psychiatric: Her behavior is normal. Mood normal.     Results for orders placed or performed in visit on 12/17/20   POCT COVID-19 Rapid Screening   Result Value Ref Range    POC Rapid  "COVID Negative Negative     Acceptable Yes          Assessment:       1. Exposure to COVID-19 virus        Plan:       Patient Instructions   NEGATIVE COVID TEST  You have tested negative for COVID-19 today.  If you did not have a close exposure (as defined below) you can return to your normal daily activities to include social distancing, wearing a mask and frequent handwashing.  A "close exposure" is defined as anyone who has had an exposure (masked or unmasked) to a known COVID -19 positive person within 6 ft for longer than 15 minutes. If your exposure meets this definition, you are required by CDC guidelines to quarantine for at least 7-10 days from time of exposure.  The CDC states that a test can be performed for an asymptomatic patient (someone who does not have any symptoms) after a close exposure, and that a test should be done if you develop symptoms after a close exposure as described above.  Specifically, you can test at day 5 or later if asymptomatic in order to get released from quarantine on day 7 or later.  If you develop symptoms sooner, you should test when your symptoms start.  If you developed symptoms since the exposure, and your test was negative today and less than 5 days from your exposure, you still have to quarantine for 7-10 days from the date of the exposure.  The 7-10 day quarantine begins from the day you were exposed, not the day of your test.  For example, if your exposure was on a Monday, and you waited until Friday of the same week to get tested and it was negative, your 7-10 day quarantine begins from that Monday, not the Friday you tested negative.  Please note, if you decide to test as an asymptomatic during your quarantine and you are positive, you will be restarting your quarantine and moving from a possible 10 day quarantine (if you do not test), to a 11 day or greater quarantine.        Exposure to COVID-19 virus  -     POCT COVID-19 Rapid Screening           "

## 2020-12-17 NOTE — PATIENT INSTRUCTIONS
"NEGATIVE COVID TEST  You have tested negative for COVID-19 today.  If you did not have a close exposure (as defined below) you can return to your normal daily activities to include social distancing, wearing a mask and frequent handwashing.  A "close exposure" is defined as anyone who has had an exposure (masked or unmasked) to a known COVID -19 positive person within 6 ft for longer than 15 minutes. If your exposure meets this definition, you are required by CDC guidelines to quarantine for at least 7-10 days from time of exposure.  The CDC states that a test can be performed for an asymptomatic patient (someone who does not have any symptoms) after a close exposure, and that a test should be done if you develop symptoms after a close exposure as described above.  Specifically, you can test at day 5 or later if asymptomatic in order to get released from quarantine on day 7 or later.  If you develop symptoms sooner, you should test when your symptoms start.  If you developed symptoms since the exposure, and your test was negative today and less than 5 days from your exposure, you still have to quarantine for 7-10 days from the date of the exposure.  The 7-10 day quarantine begins from the day you were exposed, not the day of your test.  For example, if your exposure was on a Monday, and you waited until Friday of the same week to get tested and it was negative, your 7-10 day quarantine begins from that Monday, not the Friday you tested negative.  Please note, if you decide to test as an asymptomatic during your quarantine and you are positive, you will be restarting your quarantine and moving from a possible 10 day quarantine (if you do not test), to a 11 day or greater quarantine.  "

## 2020-12-26 PROBLEM — M79.672 PAIN IN BOTH FEET: Status: ACTIVE | Noted: 2020-12-26

## 2020-12-26 PROBLEM — M20.12 HALLUX VALGUS, BILATERAL: Status: ACTIVE | Noted: 2020-12-26

## 2020-12-26 PROBLEM — M72.2 PLANTAR FASCIITIS: Status: ACTIVE | Noted: 2020-12-26

## 2020-12-26 PROBLEM — M79.671 PAIN IN BOTH FEET: Status: ACTIVE | Noted: 2020-12-26

## 2020-12-26 PROBLEM — M20.11 HALLUX VALGUS, BILATERAL: Status: ACTIVE | Noted: 2020-12-26

## 2021-03-19 ENCOUNTER — CLINICAL SUPPORT (OUTPATIENT)
Dept: PSYCHIATRY | Facility: CLINIC | Age: 36
End: 2021-03-19
Payer: MEDICAID

## 2021-03-19 ENCOUNTER — OFFICE VISIT (OUTPATIENT)
Dept: PSYCHIATRY | Facility: CLINIC | Age: 36
End: 2021-03-19
Payer: MEDICAID

## 2021-03-19 ENCOUNTER — PATIENT MESSAGE (OUTPATIENT)
Dept: PSYCHIATRY | Facility: CLINIC | Age: 36
End: 2021-03-19

## 2021-03-19 ENCOUNTER — TELEPHONE (OUTPATIENT)
Dept: PSYCHIATRY | Facility: HOSPITAL | Age: 36
End: 2021-03-19

## 2021-03-19 VITALS
WEIGHT: 215.81 LBS | DIASTOLIC BLOOD PRESSURE: 66 MMHG | BODY MASS INDEX: 33.8 KG/M2 | SYSTOLIC BLOOD PRESSURE: 121 MMHG | HEART RATE: 87 BPM

## 2021-03-19 DIAGNOSIS — F98.8 ATTENTION DEFICIT DISORDER (ADD) WITHOUT HYPERACTIVITY: Primary | ICD-10-CM

## 2021-03-19 DIAGNOSIS — Z13.39 ADHD (ATTENTION DEFICIT HYPERACTIVITY DISORDER) EVALUATION: Primary | ICD-10-CM

## 2021-03-19 DIAGNOSIS — Z86.59 HISTORY OF ADHD: ICD-10-CM

## 2021-03-19 LAB
AMPHET+METHAMPHET UR QL: NEGATIVE
BARBITURATES UR QL SCN>200 NG/ML: NEGATIVE
BENZODIAZ UR QL SCN>200 NG/ML: NEGATIVE
BZE UR QL SCN: NEGATIVE
CANNABINOIDS UR QL SCN: NEGATIVE
CREAT UR-MCNC: 96 MG/DL (ref 15–325)
ETHANOL UR-MCNC: <10 MG/DL
METHADONE UR QL SCN>300 NG/ML: NEGATIVE
OPIATES UR QL SCN: NEGATIVE
PCP UR QL SCN>25 NG/ML: NEGATIVE
TOXICOLOGY INFORMATION: NORMAL

## 2021-03-19 PROCEDURE — 99205 PR OFFICE/OUTPT VISIT, NEW, LEVL V, 60-74 MIN: ICD-10-PCS | Mod: 95,SA,HB, | Performed by: NURSE PRACTITIONER

## 2021-03-19 PROCEDURE — 99205 OFFICE O/P NEW HI 60 MIN: CPT | Mod: 95,SA,HB, | Performed by: NURSE PRACTITIONER

## 2021-03-19 PROCEDURE — 80307 DRUG TEST PRSMV CHEM ANLYZR: CPT | Performed by: NURSE PRACTITIONER

## 2021-03-19 PROCEDURE — 99999 PR PBB SHADOW E&M-EST. PATIENT-LVL II: CPT | Mod: PBBFAC,HB,,

## 2021-03-19 PROCEDURE — 99999 PR PBB SHADOW E&M-EST. PATIENT-LVL II: ICD-10-PCS | Mod: PBBFAC,HB,,

## 2021-03-19 RX ORDER — DEXTROAMPHETAMINE SACCHARATE, AMPHETAMINE ASPARTATE, DEXTROAMPHETAMINE SULFATE AND AMPHETAMINE SULFATE 5; 5; 5; 5 MG/1; MG/1; MG/1; MG/1
1 TABLET ORAL DAILY
Qty: 30 TABLET | Refills: 0 | Status: SHIPPED | OUTPATIENT
Start: 2021-03-19 | End: 2021-04-17 | Stop reason: SDUPTHER

## 2021-04-16 ENCOUNTER — PATIENT MESSAGE (OUTPATIENT)
Dept: PSYCHIATRY | Facility: CLINIC | Age: 36
End: 2021-04-16

## 2021-04-17 DIAGNOSIS — F98.8 ATTENTION DEFICIT DISORDER (ADD) WITHOUT HYPERACTIVITY: Primary | ICD-10-CM

## 2021-04-17 RX ORDER — DEXTROAMPHETAMINE SACCHARATE, AMPHETAMINE ASPARTATE, DEXTROAMPHETAMINE SULFATE AND AMPHETAMINE SULFATE 5; 5; 5; 5 MG/1; MG/1; MG/1; MG/1
1 TABLET ORAL DAILY
Qty: 30 TABLET | Refills: 0 | Status: SHIPPED | OUTPATIENT
Start: 2021-04-17 | End: 2021-06-09 | Stop reason: SDUPTHER

## 2021-04-26 ENCOUNTER — PATIENT MESSAGE (OUTPATIENT)
Dept: RESEARCH | Facility: HOSPITAL | Age: 36
End: 2021-04-26

## 2021-05-19 ENCOUNTER — PATIENT MESSAGE (OUTPATIENT)
Dept: PRIMARY CARE CLINIC | Facility: CLINIC | Age: 36
End: 2021-05-19

## 2021-05-19 DIAGNOSIS — Q82.5 BIRTH MARK: ICD-10-CM

## 2021-05-19 DIAGNOSIS — L91.8 SKIN TAG: Primary | ICD-10-CM

## 2021-05-21 ENCOUNTER — TELEPHONE (OUTPATIENT)
Dept: DERMATOLOGY | Facility: CLINIC | Age: 36
End: 2021-05-21

## 2021-06-09 ENCOUNTER — PATIENT MESSAGE (OUTPATIENT)
Dept: OBSTETRICS AND GYNECOLOGY | Facility: CLINIC | Age: 36
End: 2021-06-09

## 2021-06-09 ENCOUNTER — PATIENT MESSAGE (OUTPATIENT)
Dept: DERMATOLOGY | Facility: CLINIC | Age: 36
End: 2021-06-09

## 2021-06-09 ENCOUNTER — NURSE TRIAGE (OUTPATIENT)
Dept: ADMINISTRATIVE | Facility: CLINIC | Age: 36
End: 2021-06-09

## 2021-06-10 ENCOUNTER — PATIENT MESSAGE (OUTPATIENT)
Dept: PSYCHIATRY | Facility: CLINIC | Age: 36
End: 2021-06-10

## 2021-06-14 ENCOUNTER — OFFICE VISIT (OUTPATIENT)
Dept: OBSTETRICS AND GYNECOLOGY | Facility: CLINIC | Age: 36
End: 2021-06-14
Payer: MEDICAID

## 2021-06-14 VITALS
DIASTOLIC BLOOD PRESSURE: 70 MMHG | BODY MASS INDEX: 28.94 KG/M2 | SYSTOLIC BLOOD PRESSURE: 122 MMHG | WEIGHT: 184.75 LBS

## 2021-06-14 DIAGNOSIS — Z12.4 ENCOUNTER FOR PAPANICOLAOU SMEAR FOR CERVICAL CANCER SCREENING: ICD-10-CM

## 2021-06-14 DIAGNOSIS — Z01.419 WELL WOMAN EXAM WITH ROUTINE GYNECOLOGICAL EXAM: Primary | ICD-10-CM

## 2021-06-14 DIAGNOSIS — N93.0 POSTCOITAL BLEEDING: ICD-10-CM

## 2021-06-14 PROCEDURE — 87491 CHLMYD TRACH DNA AMP PROBE: CPT | Performed by: OBSTETRICS & GYNECOLOGY

## 2021-06-14 PROCEDURE — 99395 PREV VISIT EST AGE 18-39: CPT | Mod: S$PBB,,, | Performed by: OBSTETRICS & GYNECOLOGY

## 2021-06-14 PROCEDURE — 99212 OFFICE O/P EST SF 10 MIN: CPT | Mod: S$PBB,25,, | Performed by: OBSTETRICS & GYNECOLOGY

## 2021-06-14 PROCEDURE — 99213 OFFICE O/P EST LOW 20 MIN: CPT | Mod: PBBFAC | Performed by: OBSTETRICS & GYNECOLOGY

## 2021-06-14 PROCEDURE — 87661 TRICHOMONAS VAGINALIS AMPLIF: CPT | Mod: 59 | Performed by: OBSTETRICS & GYNECOLOGY

## 2021-06-14 PROCEDURE — 87624 HPV HI-RISK TYP POOLED RSLT: CPT | Performed by: OBSTETRICS & GYNECOLOGY

## 2021-06-14 PROCEDURE — 99395 PR PREVENTIVE VISIT,EST,18-39: ICD-10-PCS | Mod: S$PBB,,, | Performed by: OBSTETRICS & GYNECOLOGY

## 2021-06-14 PROCEDURE — 88175 CYTOPATH C/V AUTO FLUID REDO: CPT | Performed by: OBSTETRICS & GYNECOLOGY

## 2021-06-14 PROCEDURE — 99212 PR OFFICE/OUTPT VISIT, EST, LEVL II, 10-19 MIN: ICD-10-PCS | Mod: S$PBB,25,, | Performed by: OBSTETRICS & GYNECOLOGY

## 2021-06-14 PROCEDURE — 99999 PR PBB SHADOW E&M-EST. PATIENT-LVL III: CPT | Mod: PBBFAC,,, | Performed by: OBSTETRICS & GYNECOLOGY

## 2021-06-14 PROCEDURE — 87591 N.GONORRHOEAE DNA AMP PROB: CPT | Mod: 59 | Performed by: OBSTETRICS & GYNECOLOGY

## 2021-06-14 PROCEDURE — 99999 PR PBB SHADOW E&M-EST. PATIENT-LVL III: ICD-10-PCS | Mod: PBBFAC,,, | Performed by: OBSTETRICS & GYNECOLOGY

## 2021-06-14 PROCEDURE — 87481 CANDIDA DNA AMP PROBE: CPT | Mod: 59 | Performed by: OBSTETRICS & GYNECOLOGY

## 2021-06-16 LAB
BACTERIAL VAGINOSIS DNA: NEGATIVE
CANDIDA GLABRATA DNA: NEGATIVE
CANDIDA KRUSEI DNA: NEGATIVE
CANDIDA RRNA VAG QL PROBE: NEGATIVE
T VAGINALIS RRNA GENITAL QL PROBE: NEGATIVE

## 2021-06-17 LAB
C TRACH DNA SPEC QL NAA+PROBE: NOT DETECTED
N GONORRHOEA DNA SPEC QL NAA+PROBE: NOT DETECTED

## 2021-06-18 LAB
FINAL PATHOLOGIC DIAGNOSIS: NORMAL
HPV HR 12 DNA SPEC QL NAA+PROBE: NEGATIVE
HPV16 AG SPEC QL: NEGATIVE
HPV18 DNA SPEC QL NAA+PROBE: NEGATIVE
Lab: NORMAL

## 2021-06-23 ENCOUNTER — PATIENT MESSAGE (OUTPATIENT)
Dept: PSYCHIATRY | Facility: CLINIC | Age: 36
End: 2021-06-23

## 2021-06-29 ENCOUNTER — HOSPITAL ENCOUNTER (OUTPATIENT)
Dept: RADIOLOGY | Facility: OTHER | Age: 36
Discharge: HOME | End: 2021-06-29
Attending: OBSTETRICS & GYNECOLOGY
Payer: MEDICAID

## 2021-06-29 DIAGNOSIS — N93.0 POSTCOITAL BLEEDING: ICD-10-CM

## 2021-06-29 PROCEDURE — 76856 US PELVIS COMP WITH TRANSVAG NON-OB (XPD): ICD-10-PCS | Mod: 26,,, | Performed by: RADIOLOGY

## 2021-06-29 PROCEDURE — 76856 US EXAM PELVIC COMPLETE: CPT | Mod: TC

## 2021-06-29 PROCEDURE — 76856 US EXAM PELVIC COMPLETE: CPT | Mod: 26,,, | Performed by: RADIOLOGY

## 2021-06-29 PROCEDURE — 76830 US PELVIS COMP WITH TRANSVAG NON-OB (XPD): ICD-10-PCS | Mod: 26,,, | Performed by: RADIOLOGY

## 2021-06-29 PROCEDURE — 76830 TRANSVAGINAL US NON-OB: CPT | Mod: 26,,, | Performed by: RADIOLOGY

## 2021-09-27 ENCOUNTER — PATIENT MESSAGE (OUTPATIENT)
Dept: PSYCHIATRY | Facility: CLINIC | Age: 36
End: 2021-09-27

## 2021-10-01 ENCOUNTER — OFFICE VISIT (OUTPATIENT)
Dept: PSYCHIATRY | Facility: CLINIC | Age: 36
End: 2021-10-01
Payer: MEDICAID

## 2021-10-01 ENCOUNTER — PATIENT MESSAGE (OUTPATIENT)
Dept: PSYCHIATRY | Facility: CLINIC | Age: 36
End: 2021-10-01

## 2021-10-01 DIAGNOSIS — F98.8 ATTENTION DEFICIT DISORDER (ADD) WITHOUT HYPERACTIVITY: Primary | ICD-10-CM

## 2021-10-01 PROCEDURE — 99213 OFFICE O/P EST LOW 20 MIN: CPT | Mod: SA,HB,95, | Performed by: NURSE PRACTITIONER

## 2021-10-01 PROCEDURE — 99213 PR OFFICE/OUTPT VISIT, EST, LEVL III, 20-29 MIN: ICD-10-PCS | Mod: SA,HB,95, | Performed by: NURSE PRACTITIONER

## 2021-10-01 RX ORDER — DEXTROAMPHETAMINE SACCHARATE, AMPHETAMINE ASPARTATE, DEXTROAMPHETAMINE SULFATE AND AMPHETAMINE SULFATE 5; 5; 5; 5 MG/1; MG/1; MG/1; MG/1
1 TABLET ORAL 2 TIMES DAILY
Qty: 60 TABLET | Refills: 0 | Status: SHIPPED | OUTPATIENT
Start: 2021-10-01

## 2021-10-01 RX ORDER — DEXTROAMPHETAMINE SACCHARATE, AMPHETAMINE ASPARTATE, DEXTROAMPHETAMINE SULFATE AND AMPHETAMINE SULFATE 5; 5; 5; 5 MG/1; MG/1; MG/1; MG/1
1 TABLET ORAL 2 TIMES DAILY
Qty: 60 TABLET | Refills: 0 | Status: SHIPPED | OUTPATIENT
Start: 2021-10-29 | End: 2022-03-22 | Stop reason: SDUPTHER

## 2021-10-02 ENCOUNTER — NURSE TRIAGE (OUTPATIENT)
Dept: ADMINISTRATIVE | Facility: CLINIC | Age: 36
End: 2021-10-02

## 2021-10-02 ENCOUNTER — PATIENT MESSAGE (OUTPATIENT)
Dept: PSYCHIATRY | Facility: CLINIC | Age: 36
End: 2021-10-02

## 2021-11-18 ENCOUNTER — PATIENT MESSAGE (OUTPATIENT)
Dept: PSYCHIATRY | Facility: CLINIC | Age: 36
End: 2021-11-18
Payer: MEDICAID

## 2022-03-22 DIAGNOSIS — F98.8 ATTENTION DEFICIT DISORDER (ADD) WITHOUT HYPERACTIVITY: ICD-10-CM

## 2022-03-23 RX ORDER — DEXTROAMPHETAMINE SACCHARATE, AMPHETAMINE ASPARTATE, DEXTROAMPHETAMINE SULFATE AND AMPHETAMINE SULFATE 5; 5; 5; 5 MG/1; MG/1; MG/1; MG/1
1 TABLET ORAL 2 TIMES DAILY
Qty: 60 TABLET | Refills: 0 | Status: SHIPPED | OUTPATIENT
Start: 2022-03-23

## 2022-06-07 ENCOUNTER — PATIENT MESSAGE (OUTPATIENT)
Dept: PRIMARY CARE CLINIC | Facility: CLINIC | Age: 37
End: 2022-06-07
Payer: MEDICAID

## 2023-05-11 ENCOUNTER — PATIENT OUTREACH (OUTPATIENT)
Dept: ADMINISTRATIVE | Facility: HOSPITAL | Age: 38
End: 2023-05-11
Payer: MEDICAID
